# Patient Record
Sex: FEMALE | Race: WHITE | Employment: PART TIME | URBAN - NONMETROPOLITAN AREA
[De-identification: names, ages, dates, MRNs, and addresses within clinical notes are randomized per-mention and may not be internally consistent; named-entity substitution may affect disease eponyms.]

---

## 2019-06-26 ENCOUNTER — APPOINTMENT (OUTPATIENT)
Dept: CT IMAGING | Age: 19
DRG: 885 | End: 2019-06-26
Payer: COMMERCIAL

## 2019-06-26 ENCOUNTER — HOSPITAL ENCOUNTER (OUTPATIENT)
Age: 19
Setting detail: OBSERVATION
Discharge: PSYCHIATRIC OR SUBSTANCE USE FACILITY | DRG: 885 | End: 2019-06-27
Attending: FAMILY MEDICINE | Admitting: PSYCHIATRY & NEUROLOGY
Payer: COMMERCIAL

## 2019-06-26 ENCOUNTER — APPOINTMENT (OUTPATIENT)
Dept: GENERAL RADIOLOGY | Age: 19
DRG: 885 | End: 2019-06-26
Payer: COMMERCIAL

## 2019-06-26 DIAGNOSIS — F23 ACUTE PSYCHOSIS (HCC): Primary | ICD-10-CM

## 2019-06-26 PROBLEM — F43.10 PTSD (POST-TRAUMATIC STRESS DISORDER): Status: ACTIVE | Noted: 2019-06-26

## 2019-06-26 LAB
ACETAMINOPHEN LEVEL: < 5 UG/ML (ref 0–20)
ALBUMIN SERPL-MCNC: 4.1 G/DL (ref 3.5–5.1)
ALP BLD-CCNC: 61 U/L (ref 38–126)
ALT SERPL-CCNC: 9 U/L (ref 11–66)
AMPHETAMINE+METHAMPHETAMINE URINE SCREEN: NEGATIVE
ANION GAP SERPL CALCULATED.3IONS-SCNC: 10 MEQ/L (ref 8–16)
AST SERPL-CCNC: 19 U/L (ref 5–40)
BACTERIA: ABNORMAL /HPF
BARBITURATE QUANTITATIVE URINE: NEGATIVE
BASOPHILS # BLD: 0.5 %
BASOPHILS ABSOLUTE: 0 THOU/MM3 (ref 0–0.1)
BENZODIAZEPINE QUANTITATIVE URINE: NEGATIVE
BILIRUB SERPL-MCNC: 0.7 MG/DL (ref 0.3–1.2)
BILIRUBIN URINE: ABNORMAL
BLOOD, URINE: NEGATIVE
BUN BLDV-MCNC: 13 MG/DL (ref 7–22)
C-REACTIVE PROTEIN: < 0.03 MG/DL (ref 0–1)
CALCIUM SERPL-MCNC: 8.3 MG/DL (ref 8.5–10.5)
CANNABINOID QUANTITATIVE URINE: NEGATIVE
CASTS 2: ABNORMAL /LPF
CASTS UA: ABNORMAL /LPF
CHARACTER, URINE: ABNORMAL
CHLORIDE BLD-SCNC: 103 MEQ/L (ref 98–111)
CO2: 21 MEQ/L (ref 23–33)
COCAINE METABOLITE QUANTITATIVE URINE: NEGATIVE
COLOR: YELLOW
CREAT SERPL-MCNC: 0.7 MG/DL (ref 0.4–1.2)
CRYSTALS, UA: ABNORMAL
EOSINOPHIL # BLD: 0.6 %
EOSINOPHILS ABSOLUTE: 0 THOU/MM3 (ref 0–0.4)
EPITHELIAL CELLS, UA: ABNORMAL /HPF
ERYTHROCYTE [DISTWIDTH] IN BLOOD BY AUTOMATED COUNT: 12.5 % (ref 11.5–14.5)
ERYTHROCYTE [DISTWIDTH] IN BLOOD BY AUTOMATED COUNT: 41.3 FL (ref 35–45)
ETHYL ALCOHOL, SERUM: < 0.01 %
GFR SERPL CREATININE-BSD FRML MDRD: > 90 ML/MIN/1.73M2
GLUCOSE BLD-MCNC: 148 MG/DL (ref 70–108)
GLUCOSE BLD-MCNC: 70 MG/DL (ref 70–108)
GLUCOSE URINE: NEGATIVE MG/DL
HCT VFR BLD CALC: 37.7 % (ref 37–47)
HEMOGLOBIN: 12.8 GM/DL (ref 12–16)
ICTOTEST: NEGATIVE
IMMATURE GRANS (ABS): 0.02 THOU/MM3 (ref 0–0.07)
IMMATURE GRANULOCYTES: 0.3 %
KETONES, URINE: >= 160
LEUKOCYTE ESTERASE, URINE: ABNORMAL
LIPASE: 20.3 U/L (ref 5.6–51.3)
LYMPHOCYTES # BLD: 27.4 %
LYMPHOCYTES ABSOLUTE: 2.1 THOU/MM3 (ref 1–4.8)
MAGNESIUM: 1.8 MG/DL (ref 1.6–2.4)
MCH RBC QN AUTO: 30.7 PG (ref 26–33)
MCHC RBC AUTO-ENTMCNC: 34 GM/DL (ref 32.2–35.5)
MCV RBC AUTO: 90.4 FL (ref 81–99)
MISCELLANEOUS 2: ABNORMAL
MONOCYTES # BLD: 7.9 %
MONOCYTES ABSOLUTE: 0.6 THOU/MM3 (ref 0.4–1.3)
MUCUS: ABNORMAL
NITRITE, URINE: NEGATIVE
NUCLEATED RED BLOOD CELLS: 0 /100 WBC
OPIATES, URINE: NEGATIVE
OSMOLALITY CALCULATION: 271.1 MOSMOL/KG (ref 275–300)
OXYCODONE: NEGATIVE
PH UA: 5.5 (ref 5–9)
PHENCYCLIDINE QUANTITATIVE URINE: NEGATIVE
PLATELET # BLD: 144 THOU/MM3 (ref 130–400)
PMV BLD AUTO: 9.3 FL (ref 9.4–12.4)
POTASSIUM SERPL-SCNC: 3.4 MEQ/L (ref 3.5–5.2)
PREGNANCY, SERUM: NEGATIVE
PRO-BNP: 19.3 PG/ML (ref 0–450)
PROCALCITONIN: 0.02 NG/ML (ref 0.01–0.09)
PROTEIN UA: ABNORMAL
RBC # BLD: 4.17 MILL/MM3 (ref 4.2–5.4)
RBC URINE: ABNORMAL /HPF
RENAL EPITHELIAL, UA: ABNORMAL
SALICYLATE, SERUM: < 0.3 MG/DL (ref 2–10)
SEDIMENTATION RATE, ERYTHROCYTE: 4 MM/HR (ref 0–20)
SEG NEUTROPHILS: 63.3 %
SEGMENTED NEUTROPHILS ABSOLUTE COUNT: 4.9 THOU/MM3 (ref 1.8–7.7)
SODIUM BLD-SCNC: 134 MEQ/L (ref 135–145)
SPECIFIC GRAVITY, URINE: > 1.03 (ref 1–1.03)
TOTAL PROTEIN: 6.5 G/DL (ref 6.1–8)
TROPONIN T: < 0.01 NG/ML
TSH SERPL DL<=0.05 MIU/L-ACNC: 1.5 UIU/ML (ref 0.4–4.2)
UROBILINOGEN, URINE: 0.2 EU/DL (ref 0–1)
WBC # BLD: 7.8 THOU/MM3 (ref 4.8–10.8)
WBC UA: ABNORMAL /HPF
YEAST: ABNORMAL

## 2019-06-26 PROCEDURE — 2709999900 HC NON-CHARGEABLE SUPPLY

## 2019-06-26 PROCEDURE — 83880 ASSAY OF NATRIURETIC PEPTIDE: CPT

## 2019-06-26 PROCEDURE — G0480 DRUG TEST DEF 1-7 CLASSES: HCPCS

## 2019-06-26 PROCEDURE — 80053 COMPREHEN METABOLIC PANEL: CPT

## 2019-06-26 PROCEDURE — 86140 C-REACTIVE PROTEIN: CPT

## 2019-06-26 PROCEDURE — 84703 CHORIONIC GONADOTROPIN ASSAY: CPT

## 2019-06-26 PROCEDURE — 82948 REAGENT STRIP/BLOOD GLUCOSE: CPT

## 2019-06-26 PROCEDURE — 85651 RBC SED RATE NONAUTOMATED: CPT

## 2019-06-26 PROCEDURE — 82550 ASSAY OF CK (CPK): CPT

## 2019-06-26 PROCEDURE — 84484 ASSAY OF TROPONIN QUANT: CPT

## 2019-06-26 PROCEDURE — 84443 ASSAY THYROID STIM HORMONE: CPT

## 2019-06-26 PROCEDURE — 80307 DRUG TEST PRSMV CHEM ANLYZR: CPT

## 2019-06-26 PROCEDURE — 36415 COLL VENOUS BLD VENIPUNCTURE: CPT

## 2019-06-26 PROCEDURE — 83735 ASSAY OF MAGNESIUM: CPT

## 2019-06-26 PROCEDURE — 70450 CT HEAD/BRAIN W/O DYE: CPT

## 2019-06-26 PROCEDURE — 81001 URINALYSIS AUTO W/SCOPE: CPT

## 2019-06-26 PROCEDURE — 99285 EMERGENCY DEPT VISIT HI MDM: CPT

## 2019-06-26 PROCEDURE — G0378 HOSPITAL OBSERVATION PER HR: HCPCS

## 2019-06-26 PROCEDURE — 84145 PROCALCITONIN (PCT): CPT

## 2019-06-26 PROCEDURE — 87086 URINE CULTURE/COLONY COUNT: CPT

## 2019-06-26 PROCEDURE — 83690 ASSAY OF LIPASE: CPT

## 2019-06-26 PROCEDURE — 85025 COMPLETE CBC W/AUTO DIFF WBC: CPT

## 2019-06-26 RX ORDER — 0.9 % SODIUM CHLORIDE 0.9 %
1000 INTRAVENOUS SOLUTION INTRAVENOUS ONCE
Status: DISCONTINUED | OUTPATIENT
Start: 2019-06-26 | End: 2019-06-27 | Stop reason: HOSPADM

## 2019-06-26 ASSESSMENT — PAIN DESCRIPTION - ORIENTATION
ORIENTATION: LOWER
ORIENTATION: LOWER

## 2019-06-26 ASSESSMENT — PAIN DESCRIPTION - FREQUENCY
FREQUENCY: CONTINUOUS
FREQUENCY: CONTINUOUS

## 2019-06-26 ASSESSMENT — PAIN SCALES - GENERAL
PAINLEVEL_OUTOF10: 4
PAINLEVEL_OUTOF10: 4
PAINLEVEL_OUTOF10: 0

## 2019-06-26 ASSESSMENT — ENCOUNTER SYMPTOMS: ABDOMINAL PAIN: 1

## 2019-06-26 ASSESSMENT — PAIN DESCRIPTION - PAIN TYPE
TYPE: ACUTE PAIN
TYPE: ACUTE PAIN

## 2019-06-26 ASSESSMENT — PAIN DESCRIPTION - LOCATION
LOCATION: ABDOMEN
LOCATION: ABDOMEN

## 2019-06-26 NOTE — ED PROVIDER NOTES
Wet Read Report Reviewed   [] Discussed with Radiologist.    Jayne Martinez:   Results for orders placed or performed during the hospital encounter of 06/26/19   CBC Auto Differential   Result Value Ref Range    WBC 7.8 4.8 - 10.8 thou/mm3    RBC 4.17 (L) 4.20 - 5.40 mill/mm3    Hemoglobin 12.8 12.0 - 16.0 gm/dl    Hematocrit 37.7 37.0 - 47.0 %    MCV 90.4 81.0 - 99.0 fL    MCH 30.7 26.0 - 33.0 pg    MCHC 34.0 32.2 - 35.5 gm/dl    RDW-CV 12.5 11.5 - 14.5 %    RDW-SD 41.3 35.0 - 45.0 fL    Platelets 475 509 - 040 thou/mm3    MPV 9.3 (L) 9.4 - 12.4 fL    Seg Neutrophils 63.3 %    Lymphocytes 27.4 %    Monocytes 7.9 %    Eosinophils 0.6 %    Basophils 0.5 %    Immature Granulocytes 0.3 %    Segs Absolute 4.9 1.8 - 7.7 thou/mm3    Lymphocytes # 2.1 1.0 - 4.8 thou/mm3    Monocytes # 0.6 0.4 - 1.3 thou/mm3    Eosinophils # 0.0 0.0 - 0.4 thou/mm3    Basophils # 0.0 0.0 - 0.1 thou/mm3    Immature Grans (Abs) 0.02 0.00 - 0.07 thou/mm3    nRBC 0 /100 wbc   Comprehensive Metabolic Panel   Result Value Ref Range    Glucose 148 (H) 70 - 108 mg/dL    CREATININE 0.7 0.4 - 1.2 mg/dL    BUN 13 7 - 22 mg/dL    Sodium 134 (L) 135 - 145 meq/L    Potassium 3.4 (L) 3.5 - 5.2 meq/L    Chloride 103 98 - 111 meq/L    CO2 21 (L) 23 - 33 meq/L    Calcium 8.3 (L) 8.5 - 10.5 mg/dL    AST 19 5 - 40 U/L    Alkaline Phosphatase 61 38 - 126 U/L    Total Protein 6.5 6.1 - 8.0 g/dL    Alb 4.1 3.5 - 5.1 g/dL    Total Bilirubin 0.7 0.3 - 1.2 mg/dL    ALT 9 (L) 11 - 66 U/L   Lipase   Result Value Ref Range    Lipase 20.3 5.6 - 51.3 U/L   C-reactive protein   Result Value Ref Range    CRP < 0.03 0.00 - 1.00 mg/dl   Sedimentation Rate   Result Value Ref Range    Sed Rate 4 0 - 20 mm/hr   HCG Qualitative, Serum   Result Value Ref Range    Preg, Serum NEGATIVE NEGATIVE   Acetaminophen level   Result Value Ref Range    Acetaminophen Level < 5.0 0.0 - 59.5 ug/mL   Salicylate Level   Result Value Ref Range    Salicylate, Serum < 0.3 (L) 2.0 - 10.0 mg/dL   Urine Drug Screen   Result Value Ref Range    AMPHETAMINE+METHAMPHETAMINE URINE SCREEN Negative NEGATIVE    Barbiturate Quant, Ur Negative NEGATIVE    Benzodiazepine Quant, Ur Negative NEGATIVE    Cannabinoid Quant, Ur Negative NEGATIVE    Cocaine Metab Quant, Ur Negative NEGATIVE    Opiates, Urine Negative NEGATIVE    Oxycodone Negative NEGATIVE    PCP Quant, Ur Negative NEGATIVE   CK   Result Value Ref Range    Total  (H) 30 - 135 U/L   Troponin   Result Value Ref Range    Troponin T < 0.010 ng/ml   Brain Natriuretic Peptide   Result Value Ref Range    Pro-BNP 19.3 0.0 - 450.0 pg/mL   TSH with Reflex   Result Value Ref Range    TSH 1.500 0.400 - 4.20 uIU/mL   Magnesium   Result Value Ref Range    Magnesium 1.8 1.6 - 2.4 mg/dL   Ethanol   Result Value Ref Range    ETHYL ALCOHOL, SERUM < 0.01 0.00 %   Urine with Reflexed Micro   Result Value Ref Range    Glucose, Ur NEGATIVE NEGATIVE mg/dl    Bilirubin Urine SMALL (A) NEGATIVE    Ketones, Urine >= 160 (A) NEGATIVE    Specific Gravity, Urine > 1.030 (A) 1.002 - 1.03    Blood, Urine NEGATIVE NEGATIVE    pH, UA 5.5 5.0 - 9.0    Protein, UA TRACE (A) NEGATIVE    Urobilinogen, Urine 0.2 0.0 - 1.0 eu/dl    Nitrite, Urine NEGATIVE NEGATIVE    Leukocyte Esterase, Urine SMALL (A) NEGATIVE    Color, UA YELLOW STRAW-YELL    Character, Urine CLOUDY (A) CLEAR-SL C    RBC, UA 0-2 0-2/hpf /hpf    WBC, UA 10-15 0-4/hpf /hpf    Epi Cells 5-10 3-5/hpf /hpf    Mucus, UA THREADS NONE SEEN/    Bacteria, UA NONE FEW/NONE S /hpf    Casts UA NONE SEEN NONE SEEN /lpf    Crystals NONE SEEN NONE SEEN    Renal Epithelial, Urine NONE SEEN NONE SEEN    Yeast, UA NONE SEEN NONE SEEN    CASTS 2 NONE SEEN NONE SEEN /lpf    MISCELLANEOUS 2 NONE SEEN    Bile Acids, Total   Result Value Ref Range    Ictotest NEGATIVE NEGATIVE   Procalcitonin   Result Value Ref Range    Procalcitonin 0.02 0.01 - 0.09 ng/mL   Anion Gap   Result Value Ref Range    Anion Gap 10.0 8.0 - 16.0 meq/L   Glomerular Filtration Rate,

## 2019-06-26 NOTE — ED TRIAGE NOTES
Patient presents to the ED via [facility] with concerns of mental health check. When medics arrive, they states [address] were on seen as patient was wondering around town asking for her friend, whom she only knew her name; friend is at bedside and states they lost touch with each other 1 year ago, and last she had heard of from patient was that she ran away from her parents at home and now lives in [facility]. Friend states she has a history of mental health and was admitted to a psych unit. Patient appears to be very fragile and fatigued. Her clothes are clean and neat and she is wearing sandals, however, she is wearing long sweat pants and a 3/4 sleeve. Patient guarding and not wanting to reveal much information. Patient's eyes are fixed and rolling in the back of her head when trying to shine light.

## 2019-06-27 ENCOUNTER — APPOINTMENT (OUTPATIENT)
Dept: ULTRASOUND IMAGING | Age: 19
DRG: 885 | End: 2019-06-27
Payer: COMMERCIAL

## 2019-06-27 ENCOUNTER — HOSPITAL ENCOUNTER (INPATIENT)
Age: 19
LOS: 11 days | Discharge: HOME | DRG: 885 | End: 2019-07-08
Attending: PSYCHIATRY & NEUROLOGY | Admitting: PSYCHIATRY & NEUROLOGY
Payer: COMMERCIAL

## 2019-06-27 VITALS
BODY MASS INDEX: 20.07 KG/M2 | TEMPERATURE: 98.4 F | DIASTOLIC BLOOD PRESSURE: 61 MMHG | HEIGHT: 66 IN | WEIGHT: 124.9 LBS | OXYGEN SATURATION: 97 % | SYSTOLIC BLOOD PRESSURE: 90 MMHG | RESPIRATION RATE: 17 BRPM | HEART RATE: 74 BPM

## 2019-06-27 LAB — TOTAL CK: 141 U/L (ref 30–135)

## 2019-06-27 PROCEDURE — APPSS60 APP SPLIT SHARED TIME 46-60 MINUTES: Performed by: PHYSICIAN ASSISTANT

## 2019-06-27 PROCEDURE — 6370000000 HC RX 637 (ALT 250 FOR IP): Performed by: PSYCHIATRY & NEUROLOGY

## 2019-06-27 PROCEDURE — G0378 HOSPITAL OBSERVATION PER HR: HCPCS

## 2019-06-27 PROCEDURE — 2709999900 HC NON-CHARGEABLE SUPPLY

## 2019-06-27 PROCEDURE — 1240000000 HC EMOTIONAL WELLNESS R&B

## 2019-06-27 PROCEDURE — 76856 US EXAM PELVIC COMPLETE: CPT

## 2019-06-27 RX ORDER — TRAZODONE HYDROCHLORIDE 50 MG/1
50 TABLET ORAL NIGHTLY PRN
Status: DISCONTINUED | OUTPATIENT
Start: 2019-06-27 | End: 2019-06-27 | Stop reason: HOSPADM

## 2019-06-27 RX ORDER — HYDROXYZINE HYDROCHLORIDE 25 MG/1
25 TABLET, FILM COATED ORAL 3 TIMES DAILY
Status: DISCONTINUED | OUTPATIENT
Start: 2019-06-27 | End: 2019-06-27 | Stop reason: HOSPADM

## 2019-06-27 RX ORDER — ACETAMINOPHEN 325 MG/1
650 TABLET ORAL EVERY 4 HOURS PRN
Status: DISCONTINUED | OUTPATIENT
Start: 2019-06-27 | End: 2019-07-08 | Stop reason: HOSPADM

## 2019-06-27 RX ORDER — ACETAMINOPHEN 325 MG/1
650 TABLET ORAL EVERY 4 HOURS PRN
Status: CANCELLED | OUTPATIENT
Start: 2019-06-27

## 2019-06-27 RX ORDER — NICOTINE 21 MG/24HR
1 PATCH, TRANSDERMAL 24 HOURS TRANSDERMAL DAILY
Status: DISCONTINUED | OUTPATIENT
Start: 2019-06-27 | End: 2019-07-06

## 2019-06-27 RX ORDER — LORAZEPAM 0.5 MG/1
0.5 TABLET ORAL ONCE
Status: COMPLETED | OUTPATIENT
Start: 2019-06-27 | End: 2019-06-27

## 2019-06-27 RX ORDER — NICOTINE 21 MG/24HR
1 PATCH, TRANSDERMAL 24 HOURS TRANSDERMAL DAILY
Status: CANCELLED | OUTPATIENT
Start: 2019-06-27

## 2019-06-27 RX ORDER — HYDROXYZINE HYDROCHLORIDE 25 MG/1
50 TABLET, FILM COATED ORAL 3 TIMES DAILY PRN
Status: CANCELLED | OUTPATIENT
Start: 2019-06-27

## 2019-06-27 RX ORDER — LORAZEPAM 2 MG/ML
1 INJECTION INTRAMUSCULAR EVERY 6 HOURS PRN
Status: DISCONTINUED | OUTPATIENT
Start: 2019-06-27 | End: 2019-07-08 | Stop reason: HOSPADM

## 2019-06-27 RX ORDER — HYDROXYZINE HYDROCHLORIDE 25 MG/1
50 TABLET, FILM COATED ORAL 3 TIMES DAILY PRN
Status: DISCONTINUED | OUTPATIENT
Start: 2019-06-27 | End: 2019-07-08 | Stop reason: HOSPADM

## 2019-06-27 RX ADMIN — LORAZEPAM 0.5 MG: 0.5 TABLET ORAL at 06:04

## 2019-06-27 SDOH — HEALTH STABILITY: MENTAL HEALTH: HOW OFTEN DO YOU HAVE A DRINK CONTAINING ALCOHOL?: NOT ASKED

## 2019-06-27 ASSESSMENT — SLEEP AND FATIGUE QUESTIONNAIRES
DO YOU HAVE DIFFICULTY SLEEPING: NO
DO YOU USE A SLEEP AID: NO

## 2019-06-27 ASSESSMENT — PAIN SCALES - GENERAL: PAINLEVEL_OUTOF10: 0

## 2019-06-27 ASSESSMENT — LIFESTYLE VARIABLES: HISTORY_ALCOHOL_USE: NO

## 2019-06-27 NOTE — ED NOTES
Pt taken to room 39 at this time via ED cart. Cart locked and lowered and pt assisted to stand and ambulated to pelvic bed in room. Pt assisted to stand, pt with an unsteady gait and stiff back. Pt states that her back does hurt. Pt also with some complaints of abdominal pain. Pt did safely get to bed and was covered with warm blankets. Pt with no other needs at this moment.       Rosa Daily RN  06/26/19 [id number]

## 2019-06-27 NOTE — ED NOTES
Patient stated she wanted the IV removed because it was painful. This nurse explained IV may still be needed for admission. Patient stated she no longer wanted it and did not want another IV placed.  This nurse removed IV per pt request.      Sharri Henley RN  06/26/19 7748

## 2019-06-27 NOTE — PROGRESS NOTES
Patient in shower, sitter in shower room with patient, patient started to shake and shiver in shower, when patient returned to room, the shaking worsened, she was moaning loudly, clenching her jaw and eyes rolling in the back of her head, 0508, Kurt Mosher and Arabella Dominguez in room, 0510, rapid response called, 0512, rapid team arrived, Levar Lopez, [address], /92, , 0514, Dr. Rebecca Glass (EKG) and resp arrived, 0515, /72, , 0516, Camron Schmitz, house supervisor arrived, 5683 Dr. Katie Womack stated it was not cardiac related and EKG was not necessary, 0514 Dr. Poppy Pascal called, message left, 0518 HR 85, 0525, BP 99/69 HR 82 O2 96% on RA, 0538, Dr. Poppy Pascal called again, message left, Narciso Hanson, Dr. Poppy Pascal called with new orders, 0604, 0.5mg ativan given, patient resting in bed with sitter at bed side, when asked if she has had episodes like this before, she nodded her head yes
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
## 2019-06-27 NOTE — PROGRESS NOTES
Pt no longer withdrawn, pt up beat, standing in room, singing, eating well now, pacing in room. Pt hit call light and ask staff to erase the L from Labs so it said abs on the board and started laughing. Dr. Crispin Marcum notified, wants to send pt to psych at this time.

## 2019-06-27 NOTE — PROGRESS NOTES
Financial staff came back to get pts SS# since pt had got it; but then states \"the paper isnt real and she knows what is real\". Therefore they will try again later.

## 2019-06-28 PROBLEM — F29 PSYCHOSIS (HCC): Status: ACTIVE | Noted: 2019-06-28

## 2019-06-28 LAB
ORGANISM: ABNORMAL
URINE CULTURE REFLEX: ABNORMAL

## 2019-06-28 PROCEDURE — 1240000000 HC EMOTIONAL WELLNESS R&B

## 2019-06-28 PROCEDURE — 6370000000 HC RX 637 (ALT 250 FOR IP): Performed by: PHYSICIAN ASSISTANT

## 2019-06-28 PROCEDURE — 6360000002 HC RX W HCPCS

## 2019-06-28 PROCEDURE — APPSS45 APP SPLIT SHARED TIME 31-45 MINUTES: Performed by: PHYSICIAN ASSISTANT

## 2019-06-28 PROCEDURE — 99222 1ST HOSP IP/OBS MODERATE 55: CPT | Performed by: PSYCHIATRY & NEUROLOGY

## 2019-06-28 PROCEDURE — 6360000002 HC RX W HCPCS: Performed by: PSYCHIATRY & NEUROLOGY

## 2019-06-28 RX ORDER — LORAZEPAM 2 MG/ML
2 INJECTION INTRAMUSCULAR ONCE
Status: COMPLETED | OUTPATIENT
Start: 2019-06-28 | End: 2019-06-28

## 2019-06-28 RX ORDER — RISPERIDONE 1 MG/1
1 TABLET, FILM COATED ORAL 2 TIMES DAILY
Status: DISCONTINUED | OUTPATIENT
Start: 2019-06-28 | End: 2019-06-30

## 2019-06-28 RX ORDER — HALOPERIDOL 5 MG/ML
5 INJECTION INTRAMUSCULAR ONCE
Status: COMPLETED | OUTPATIENT
Start: 2019-06-28 | End: 2019-06-28

## 2019-06-28 RX ORDER — HALOPERIDOL 5 MG/ML
INJECTION INTRAMUSCULAR
Status: COMPLETED
Start: 2019-06-28 | End: 2019-06-28

## 2019-06-28 RX ORDER — RISPERIDONE 0.25 MG/1
0.5 TABLET, FILM COATED ORAL 2 TIMES DAILY
Status: DISCONTINUED | OUTPATIENT
Start: 2019-06-28 | End: 2019-06-28

## 2019-06-28 RX ADMIN — LORAZEPAM 2 MG: 2 INJECTION INTRAMUSCULAR; INTRAVENOUS at 04:19

## 2019-06-28 RX ADMIN — HALOPERIDOL 5 MG: 5 INJECTION INTRAMUSCULAR at 04:19

## 2019-06-28 RX ADMIN — LORAZEPAM 1 MG: 2 INJECTION INTRAMUSCULAR; INTRAVENOUS at 03:47

## 2019-06-28 RX ADMIN — RISPERIDONE 1 MG: 1 TABLET ORAL at 23:02

## 2019-06-28 RX ADMIN — HALOPERIDOL LACTATE 5 MG: 5 INJECTION INTRAMUSCULAR at 04:19
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
## 2019-06-28 NOTE — PROGRESS NOTES
One-to-one maintained  Mental status assessment Pt remains asleep in unlocked seclusion room  Patient response to interventions pt asleep  Criteria for discontinuation of 1:1 clear thoughts, appropriate behaviors  Rational for continuance of 1:1 precautions Per Doctors orders

## 2019-06-28 NOTE — PROGRESS NOTES
Nutrition Assessment    Type and Reason for Visit: Initial(poor oral intake, unplanned weight loss)    Nutrition Recommendations:   Continue current diet. ONS initiated, Ensure Enlive TID, continue. Consider a multivitamin. Nutrition Assessment:   Pt. nutritionally compromised AEB reports of poor appetite and unplanned weight loss prior to admit. At risk for further nutrition compromise r/t psychological status and need for nutrition support. Will provide Ensure Enlive TID. Will recommend consider a multivitamin. Plan to see/assess pt further when pt appropriate. Malnutrition Assessment:  · Malnutrition Status: Insufficient data    Nutrition Risk Level: High    Nutrient Needs:  · Estimated Daily Total Kcal: 6837-0737 kcals (25-30 kcals/kg/day based on 57 kg)   · Estimated Daily Protein (g): 68 grams (1.2 gram/kg/day based on 57 kg)     Nutrition Diagnosis:   · Problem: Inadequate oral intake  · Etiology: related to Psychological cause/life stress, Insufficient energy/nutrient consumption     Signs and symptoms:  as evidenced by Diet history of poor intake    Objective Information:  · Nutrition-Focused Physical Findings: Pt with psychosis, confused, impulsive, delusional, inappropriate to see per pt's nurse, [facility]. · Wound Type: None  · Current Nutrition Therapies:  · Oral Diet Orders: General   · Oral Diet intake: 1-25%, 51-75%  · Oral Nutrition Supplement (ONS) Orders: Standard High Calorie Oral Supplement(Ensure Enlive TID)  · ONS intake: Unable to assess  · Anthropometric Measures:  · Ht: 5' 6\" (167.6 cm)   · Current Body Wt: 124 lb 8 oz (56.5 kg)(6/27/19 standing scale, no edema)  · Admission Body Wt: 124 lb 8 oz (56.5 kg)(6/27/19 standing scale, no edema)  · Usual Body Wt: (Unable to ask pt.   Only EMR weight: 6/26/19: 124#14.4oz)  · % Weight Change:  ,  Unable to determine at this time  · Ideal Body Wt: 130 lb (59 kg),   · BMI Classification: BMI 18.5 - 24.9 Normal Weight    Nutrition

## 2019-06-28 NOTE — PROGRESS NOTES
One-to-one maintained  Mental status assessment Pt remains asleep in unlocked seclusion room  Patient response to interventions pt remains asleep  Criteria for discontinuation of 1:1 clear thoughts, appropriate behaviors  Rational for continuance of 1:1 precautions Per Doctors orders

## 2019-06-28 NOTE — BH NOTE
Patient declined to attend the goal group and the recreation group due to patient sleeping at time of both groups.
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
## 2019-06-28 NOTE — CONSULTS
On floor for consult. Pt sleeping. Discussed patient with nursing staff. Prefers not to awaken patient. Her pain is controlled. She is not in a good mental position to undergo an exam or consult at this time. Recommend if she continues to have vaginal itching to use diflucan 150 mg PO x1 for possible yeast infection. She can follow up for pelvic pain and vaginal itching as an outpatient unless pain becomes severe or significantly worsens while in house.      Woodrow Gear  2:29 PM  6/28/2019

## 2019-06-28 NOTE — BH NOTE
One-to-one maintained  Mental status assessment Psychosis, confused  Patient response to interventions fair  Criteria for discontinuation of 1:1 clear thoughts, appropriate behaviors  Rational for continuance of 1:1 precautions Remains impulsive and delusional
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
## 2019-06-28 NOTE — PROGRESS NOTES
from Aurelia Myers here to complete assessment- informed pt has been asleep all shift and was not appropriate for assessment to be completed.  She reports plan to talk with staff to attempt in completing assessment Sat 6/29/2019 if unable will plan to complete on Monday 7/1/2019

## 2019-06-29 PROCEDURE — 1240000000 HC EMOTIONAL WELLNESS R&B

## 2019-06-29 PROCEDURE — 6370000000 HC RX 637 (ALT 250 FOR IP): Performed by: PHYSICIAN ASSISTANT

## 2019-06-29 PROCEDURE — 99232 SBSQ HOSP IP/OBS MODERATE 35: CPT | Performed by: PSYCHIATRY & NEUROLOGY

## 2019-06-29 RX ADMIN — RISPERIDONE 1 MG: 1 TABLET ORAL at 20:34

## 2019-06-29 ASSESSMENT — PAIN SCALES - GENERAL
PAINLEVEL_OUTOF10: 0
PAINLEVEL_OUTOF10: 0

## 2019-06-29 NOTE — BH NOTE
One-to-one maintained  Mental status assessment pt sleeping  Patient response to interventions sleeping  Criteria for discontinuation of 1:1 absence of behaviors  Rational for continuance of 1:1 precautions disruptive and confrontation behaviors with peers

## 2019-06-29 NOTE — PROGRESS NOTES
One-to-one maintained  Mental status assessment Psychosis, confused  Patient response to interventions fair  Criteria for discontinuation of 1:1 clear thoughts, appropriate behaviors  Rational for continuance of 1:1 precautions Remains impulsive and delusional     Still refusing to take medication, smiles inappropriately, laughs inappropriately, states she doesn't know why she is here

## 2019-06-29 NOTE — PROGRESS NOTES
One-to-one maintained  Mental status assessment Psychosis, confused  Patient response to interventions fair  Criteria for discontinuation of 1:1 clear thoughts, appropriate behaviors  Rational for continuance of 1:1 precautions Remains impulsive and delusional     Pt paces halls, still refusing to take medication, smiles inappropriately

## 2019-06-29 NOTE — GROUP NOTE
Group Therapy Note    Date: June 29    Group Start Time: 1330  Group End Time: 1435  Group Topic: Psychoeducation    [facility] 4E    Radha Cervantes, JENNIW        Group Therapy Note    Attendees: 6         Notes:  Group members completed a self-care inventory and processed the results, generating ideas to improve in all areas. Patient came to group late and was hesitant to participate. She stayed for a short time with some participation before deciding to leave. Status After Intervention:  Unchanged    Participation Level:  Active Listener and Interactive    Participation Quality: Appropriate, Attentive, Sharing and Supportive      Speech:  normal      Thought Process/Content: Logical      Affective Functioning: Congruent      Mood: anxious      Level of consciousness:  Alert, Oriented x4 and Attentive      Response to Learning: Able to verbalize current knowledge/experience and Able to retain information      Endings: None Reported    Modes of Intervention: Education, Support, Socialization and Exploration      Discipline Responsible: /Counselor      Signature:  Marce Arreola

## 2019-06-29 NOTE — PROGRESS NOTES
Daily Progress Note  Miguel Last MD  6/29/2019  CHIEF COMPLAINT:  psychosis    Reviewed patient's current plan of care and vital signs with nursing staff. Sleep:  8 hours last night  Attending groups: No: isolated to room    SUBJECTIVE:    Patient is seen in her room today. She was with her sitter. Discussed with her about the incident that happened on the night of admission. Patient reported that she was feeling stressed out and mentions that she could not recollect the incident at all. Continues to exhibit odd and bizarre behaviors here on the unit at times. She is paranoid that staff is trying to hurt her. She reports good sleep and appetite. Staff reported that she was refusing to take her medications. Discussed with her at length about the brief psychotic episode that she had in the past and why she was put on antipsychotic medications. Patient could not really explain her situation and why she was taking medications. She agreed to stay and get help and also take medications today. Well continue to observe her behaviors and encouragement compliance. Mental Status Exam  Level of consciousness:  Within normal limits  Appearance: Hospital attire, seated in chair, with good grooming and hygiene   Behavior/Motor: No abnormalities noted  Attitude toward examiner:  Cooperative, attentive, good eye contact  Speech:  spontaneous, normal rate, normal volume and well articulated  Mood:  anxious  Affect: labile  Thought processes:  Some paranoia  Thought content:  denies homicidal ideation  Suicidal Ideation: denies suicidal ideation  Delusions:  no evidence of delusions  Perceptual Disturbance: Paranoid, inappropriate smiling  Cognition:  Oriented to self, location, time, and situation  Memory: age appropriate  Insight & Judgement: improving  Medication side effects:  denies       Data   height is 5' 6\" (1.676 m) and weight is 124 lb 8 oz (56.5 kg). Her oral temperature is 98 °F (36.7 °C).  Her blood pressure is 121/89 and her pulse is 101. Her respiration is 16 and oxygen saturation is 98%. Labs:   No visits with results within 2 Day(s) from this visit.    Latest known visit with results is:   Admission on 06/26/2019, Discharged on 06/27/2019   Component Date Value Ref Range Status    WBC 06/26/2019 7.8  4.8 - 10.8 thou/mm3 Final    RBC 06/26/2019 4.17* 4.20 - 5.40 mill/mm3 Final    Hemoglobin 06/26/2019 12.8  12.0 - 16.0 gm/dl Final    Hematocrit 06/26/2019 37.7  37.0 - 47.0 % Final    MCV 06/26/2019 90.4  81.0 - 99.0 fL Final    MCH 06/26/2019 30.7  26.0 - 33.0 pg Final    MCHC 06/26/2019 34.0  32.2 - 35.5 gm/dl Final    RDW-CV 06/26/2019 12.5  11.5 - 14.5 % Final    RDW-SD 06/26/2019 41.3  35.0 - 45.0 fL Final    Platelets 27/59/1335 144  130 - 400 thou/mm3 Final    MPV 06/26/2019 9.3* 9.4 - 12.4 fL Final    Seg Neutrophils 06/26/2019 63.3  % Final    Lymphocytes 06/26/2019 27.4  % Final    Monocytes 06/26/2019 7.9  % Final    Eosinophils 06/26/2019 0.6  % Final    Basophils 06/26/2019 0.5  % Final    Immature Granulocytes 06/26/2019 0.3  % Final    Segs Absolute 06/26/2019 4.9  1.8 - 7.7 thou/mm3 Final    Lymphocytes # 06/26/2019 2.1  1.0 - 4.8 thou/mm3 Final    Monocytes # 06/26/2019 0.6  0.4 - 1.3 thou/mm3 Final    Eosinophils # 06/26/2019 0.0  0.0 - 0.4 thou/mm3 Final    Basophils # 06/26/2019 0.0  0.0 - 0.1 thou/mm3 Final    Immature Grans (Abs) 06/26/2019 0.02  0.00 - 0.07 thou/mm3 Final    nRBC 06/26/2019 0  /100 wbc Final    Performed at [address] [place] [address], [facility]    Glucose 06/26/2019 148* 70 - 108 mg/dL Final    CREATININE 06/26/2019 0.7  0.4 - 1.2 mg/dL Final    BUN 06/26/2019 13  7 - 22 mg/dL Final    Sodium 06/26/2019 134* 135 - 145 meq/L Final    Potassium 06/26/2019 3.4* 3.5 - 5.2 meq/L Final    Chloride 06/26/2019 103  98 - 111 meq/L Final    CO2 06/26/2019 21* 23 - 33 meq/L Final    Calcium 06/26/2019 8.3* 8.5 - 10.5 mg/dL Final    AST 06/26/2019 19  5 - 40 U/L Final    Alkaline Phosphatase 06/26/2019 61  38 - 126 U/L Final    Total Protein 06/26/2019 6.5  6.1 - 8.0 g/dL Final    Alb 06/26/2019 4.1  3.5 - 5.1 g/dL Final    Total Bilirubin 06/26/2019 0.7  0.3 - 1.2 mg/dL Final    ALT 06/26/2019 9* 11 - 66 U/L Final    Performed at [address] [facility], [address]    Lipase 06/26/2019 20.3  5.6 - 51.3 U/L Final    Performed at [address] [place] [address], [facility]    CRP 06/26/2019 < 0.03  0.00 - 1.00 mg/dl Final    Performed at [address] [place] [address], [address]    Sed Rate 06/26/2019 4  0 - 20 mm/hr Final    Performed at [address] [place] [address], [address], Serum 06/26/2019 NEGATIVE  NEGATIVE Final    Performed at [address] [place] [address], [address]    Acetaminophen Level 06/26/2019 < 5.0  0.0 - 20.0 ug/mL Final    Performed at [address] [place] [address], [address]    Salicylate, Serum 89/45/0542 < 0.3* 2.0 - 10.0 mg/dL Final    Performed at [address] [place] [address], [address]    AMPHETAMINE+METHAMPHETAMINE URINE * 06/26/2019 Negative  NEGATIVE Final    Barbiturate Quant, Ur 06/26/2019 Negative  NEGATIVE Final    Benzodiazepine Quant, Ur 06/26/2019 Negative  NEGATIVE Final    Cannabinoid Quant, Ur 06/26/2019 Negative  NEGATIVE Final    Cocaine Metab Quant, Ur 06/26/2019 Negative  NEGATIVE Final    Opiates, Urine 06/26/2019 Negative  NEGATIVE Final    Oxycodone 06/26/2019 Negative  NEGATIVE Final    PCP Quant, Ur 06/26/2019 Negative  NEGATIVE Final    Comment: A \"Negative\" result for a drug abuse screen test indicates     that the drug concentration is below the following cutoffs:            Amphetamine/Methamphetamine     1000 ng/ml            Barbiturate                      200 ng/ml            Benzodiazapine                   200 ng/ml            Cannabinoids 50 ng/ml            Cocaine Metabolite               300 ng/ml            Opiates                          300 ng/ml            Oxycodone                        100 ng/ml            Phencyclidine                     25 ng/ml  A \"Positive\" result for a drug abuse screen test should be     considered presumptive positive until/unless confirmed     by another method. (Additional request)  Quantitative values from a reference laboratory are     available upon additional request.  These results are for medical use only. Performed at [address] [facility], [address]      Total CK 06/26/2019 141* 30 - 135 U/L Final    Performed at [address] [facility], [address]    Troponin T 06/26/2019 < 0.010  ng/ml Final    Comment: <0.010 ng/ml        Normal  > or = 0.010 ng/ml  Elevated   (99%)                      Consistent with myocardial damage    Cardiac troponin values can be elevated by many disease states in addition  to acute ischemia. These include, but are not limited to: chronic renal  failure, CHF, CVA, pulmonary embolus, COPD, myocardial trauma/surgery,  myocarditis, pericarditis, tachycardia, aortic dissection, amyloidosis,  sepsis and strenuous exercise. Serial measurement of troponin is strongly  recommended as a first step in determining whether a low level elevation  represents an acute or chronic condition. Performed at [address] [facility], [address]      Pro-BNP 06/26/2019 19.3  0.0 - 450.0 pg/mL Final    Comment:   Values < 300 pg/ml \"rule out\", or make the probability of heart failure   highly unlikely. Values which \"rule in\" heart failue are as follows:          AGE                  RANGE         <50 years            >450 pg/ml         50-75 years          >900 pg/ml         >75 years            >1800 pg/ml  Performed at [address] [facility], [address]      POC Glucose 06/26/2019 70  70 - 108 mg/dl Final Performed at [facility], [address] [facility] TSH 06/26/2019 1.500  0.400 - 4.20 uIU/mL Final    Performed at [address] [facility], [address]    Magnesium 06/26/2019 1.8  1.6 - 2.4 mg/dL Final    Performed at [address] [facility], [address], SERUM 06/26/2019 < 0.01  0.00 % Final    Performed at [address] [place] [address], [address]    Glucose, Ur 06/26/2019 NEGATIVE  NEGATIVE mg/dl Final    Bilirubin Urine 06/26/2019 SMALL* NEGATIVE Final    Ketones, Urine 06/26/2019 >= 160* NEGATIVE Final    Specific Gravity, Urine 06/26/2019 > 1.030* 1.002 - 1.03 Final    Blood, Urine 06/26/2019 NEGATIVE  NEGATIVE Final    pH, UA 06/26/2019 5.5  5.0 - 9.0 Final    Protein, UA 06/26/2019 TRACE* NEGATIVE Final    Urobilinogen, Urine 06/26/2019 0.2  0.0 - 1.0 eu/dl Final    Nitrite, Urine 06/26/2019 NEGATIVE  NEGATIVE Final    Leukocyte Esterase, Urine 06/26/2019 SMALL* NEGATIVE Final    Color, UA 06/26/2019 YELLOW  STRAW-YELL Final    Character, Urine 06/26/2019 CLOUDY* CLEAR-SL C Final    RBC, UA 06/26/2019 0-2  0-2/hpf /hpf Final    WBC, UA 06/26/2019 10-15  0-4/hpf /hpf Final    Epi Cells 06/26/2019 5-10  3-5/hpf /hpf Final    Mucus, UA 06/26/2019 THREADS  NONE SEEN/ Final    Bacteria, UA 06/26/2019 NONE  FEW/NONE S /hpf Final    Casts UA 06/26/2019 NONE SEEN  NONE SEEN /lpf Final    Crystals 06/26/2019 NONE SEEN  NONE SEEN Final    Renal Epithelial, Urine 06/26/2019 NONE SEEN  NONE SEEN Final    Yeast, UA 06/26/2019 NONE SEEN  NONE SEEN Final    CASTS 2 06/26/2019 NONE SEEN  NONE SEEN /lpf Final    MISCELLANEOUS 2 06/26/2019 NONE SEEN   Final    Performed at [facility], [address]    [address] 06/26/2019 NEGATIVE  NEGATIVE Final    Performed at [address] [facility], [address]    Urine Culture Reflex 06/26/2019 *  Final [address] [place], [place] [address]      Osmolality Calc 06/26/2019 271.1* 275.0 - 300 mOsmol/kg Final    Performed at [address] [facility], [address]            Medications  Current Facility-Administered Medications: risperiDONE (RISPERDAL) tablet 1 mg, 1 mg, Oral, BID  acetaminophen (TYLENOL) tablet 650 mg, 650 mg, Oral, Q4H PRN  hydrOXYzine (ATARAX) tablet 50 mg, 50 mg, Oral, TID PRN  magnesium hydroxide (MILK OF MAGNESIA) 400 MG/5ML suspension 30 mL, 30 mL, Oral, Daily PRN  nicotine (NICODERM CQ) 14 MG/24HR 1 patch, 1 patch, Transdermal, Daily  LORazepam (ATIVAN) injection 1 mg, 1 mg, Intramuscular, Q6H PRN    ASSESSMENT  Psychosis ([address]     PLAN  Patient s symptoms   are improving  Continue with current medications. Attempt to develop insight  Psycho-education conducted. Supportive Therapy conducted. Probable discharge is TBD  Follow-up TBD    Electronically signed by Bebo Ashby MD on 6/29/19 at 7:59 PM    **This report has been created using voice recognition software. It may contain minor errors which are inherent in voice recognition technology. **

## 2019-06-29 NOTE — GROUP NOTE
Group Therapy Note    Group Start Time: 1630  Group End Time: 1700  Group Topic: Healthy Living/Wellness    Attendees: 11    Notes:  Patient attended group, did not participate in activity but did participate in group discussion. Status After Intervention:  Improved    Participation Level:  Active Listener and Interactive    Participation Quality: Attentive, Sharing and Resistant    Speech:  hesitant    Thought Process/Content: Logical  Linear    Affective Functioning: Incongruent, Blunted and Flat    Mood: anxious and depressed    Level of consciousness:  Alert, Oriented x4 and Attentive    Response to Learning: Able to retain information, Able to change behavior, Progressing to goal and Resistant    Endings: None Reported    Modes of Intervention: Education, Support, Socialization and Activity    Discipline Responsible: Licensed Practical Nurse    Signature:  Lisa Luna LPN

## 2019-06-29 NOTE — BH NOTE
This RN has reviewed and agrees with Mel Howard LPN's data collection and has collaborated with this LPN regarding the patient's care plan.

## 2019-06-29 NOTE — PLAN OF CARE
Met This Shift  Note:   Maintained in safe and secure environment. Patient did not fill out safety plan this shift.   6/29/2019 0422 by Joana Hermosillo LPN  Outcome: Ongoing  Note:   No safety plan completed at this time. Problem: Coping:  Goal: Ability to cope will improve  Description  Ability to cope will improve  6/29/2019 1525 by Shelley Regalado RN  Outcome: Not Met This Shift  Note:   Pt has poor coping skills, smiles inappropriately  6/29/2019 0422 by Joana Hermosillo LPN  Outcome: Ongoing  Note:   Unable to assess. Patient asleep most of evening. Problem: Anxiety:  Goal: Level of anxiety will decrease  Description  Level of anxiety will decrease  6/29/2019 1525 by Shelley Regalado RN  Outcome: Not Met This Shift  Note:   Denies anxiety, may be incongruent, will continue to monitor  6/29/2019 0422 by Joana Hermosillo LPN  Outcome: Ongoing  Note:   Patient denies having any anxiety at this time. Problem: Nutrition  Goal: Optimal nutrition therapy  6/29/2019 1525 by Shelley Regalado RN  Outcome: Not Met This Shift  Note:   Pt wanders halls, eating 25% at meals, refused medication, smiles inappropriately, follows staff directions poorly  6/29/2019 0422 by Joana Hermosillo LPN  Outcome: Ongoing  Note:   Patient did not get an evenig snack. Care plan reviewed with patient.   Patient does not verbalize understanding of the plan of care and does contribute to goal setting

## 2019-06-30 PROCEDURE — 6370000000 HC RX 637 (ALT 250 FOR IP): Performed by: PHYSICIAN ASSISTANT

## 2019-06-30 PROCEDURE — 1240000000 HC EMOTIONAL WELLNESS R&B

## 2019-06-30 PROCEDURE — 6370000000 HC RX 637 (ALT 250 FOR IP): Performed by: PSYCHIATRY & NEUROLOGY

## 2019-06-30 PROCEDURE — 99232 SBSQ HOSP IP/OBS MODERATE 35: CPT | Performed by: PSYCHIATRY & NEUROLOGY

## 2019-06-30 RX ORDER — DIAPER,BRIEF,INFANT-TODD,DISP
EACH MISCELLANEOUS 2 TIMES DAILY PRN
Status: DISCONTINUED | OUTPATIENT
Start: 2019-06-30 | End: 2019-07-08 | Stop reason: HOSPADM

## 2019-06-30 RX ORDER — RISPERIDONE 0.5 MG/1
0.5 TABLET, ORALLY DISINTEGRATING ORAL NIGHTLY
Status: DISCONTINUED | OUTPATIENT
Start: 2019-07-01 | End: 2019-07-03

## 2019-06-30 RX ORDER — RISPERIDONE 1 MG/1
1 TABLET, ORALLY DISINTEGRATING ORAL 2 TIMES DAILY
Status: DISCONTINUED | OUTPATIENT
Start: 2019-06-30 | End: 2019-06-30

## 2019-06-30 RX ADMIN — HYDROXYZINE HYDROCHLORIDE 50 MG: 25 TABLET, FILM COATED ORAL at 20:55

## 2019-06-30 RX ADMIN — HYDROCORTISONE: 1 CREAM TOPICAL at 16:43

## 2019-06-30 ASSESSMENT — PAIN SCALES - GENERAL
PAINLEVEL_OUTOF10: 0
PAINLEVEL_OUTOF10: 0

## 2019-06-30 NOTE — PROGRESS NOTES
One-to-one maintained  Mental status assessment Psychosis, confused  Patient response to interventions fair  Criteria for discontinuation of 1:1 Appropriate behavior, clear thoughts  Rational for continuance of 1:1 precautions Remains impulsive and delusional     Pt refusing medication, states  told her yesterday that she only had to take medication 1x/day

## 2019-06-30 NOTE — BH NOTE
One-to-one maintained  Mental status assessment Psychosis, confused  Patient response to interventions fair  Criteria for discontinuation of 1:1 Appropriate behavior, clear thoughts  Rational for continuance of 1:1 precautions Remains impulsive and delusional

## 2019-06-30 NOTE — PLAN OF CARE
has poor coping skills, laughs when this nurse discuses them     Problem: Anxiety:  Goal: Level of anxiety will decrease  Description  Level of anxiety will decrease  Outcome: Ongoing  Note:   Pt denies anxiety     Problem: Agitation  Goal: Decrease in feelings of agitation  Outcome: Ongoing  Note:   Pt not agitated at this time, will continue to monitor     Problem: Nutrition  Goal: Optimal nutrition therapy  Outcome: Ongoing  Note:   Patient eating 25-50% of meals today. Encouraged patient to drink supplements for increase calorie intake. Problem: Discharge Planning:  Goal: Discharged to appropriate level of care  Description  Discharged to appropriate level of care  Outcome: Not Met This Shift  Note:   Discharge planners working with patient to achieve optimal discharge plan, specific to the needs of this patient. Care plan reviewed with patient.   Patient does not verbalize understanding of the plan of care and does not contribute to goal setting

## 2019-06-30 NOTE — GROUP NOTE
Group Therapy Note    Date: June 30    Group Start Time: 1630  Group End Time: 1700  Group Topic: Healthy Living/Wellness  Attendees: 8    Notes:  Patient attended group but did not work on worksheets or participate in group discussion.     Status After Intervention:  Unchanged    Participation Level: Minimal    Participation Quality: Appropriate and Resistant    Speech:  normal    Thought Process/Content: Logical  Linear    Affective Functioning: Incongruent, Blunted and Flat    Mood: anxious and depressed    Level of consciousness:  Alert and Preoccupied    Response to Learning: Able to change behavior, Progressing to goal and Resistant    Endings: None Reported    Modes of Intervention: Education, Support, Socialization and Activity    Discipline Responsible: Licensed Practical Nurse    Signature:  Nika Jhaveri LPN

## 2019-06-30 NOTE — BH NOTE
Patient stated that she would take her bedtime risperdal.  Patient did put pill in her mouth, but then attempted to cheek pill. Nurse did mouth check and encouraged patient to swallow the pill. Patient then took and drink of water with the pill in her mouth. After drinking the water she made a cough noise with hand to mouth and then put her hand behind her back when she was done. Nurse asked patient to open the hand, which she did and pill was noted to be in her hand. Patient then put the pill back into her mouth and attempted to cheek the pill instead of swallow it 2 more times. Each time nurse did a mouth check and encouraged her to swallow the pill. Patient then took a third drink of water and did swallow the pill at this time.

## 2019-07-01 PROCEDURE — 6370000000 HC RX 637 (ALT 250 FOR IP): Performed by: PSYCHIATRY & NEUROLOGY

## 2019-07-01 PROCEDURE — 1240000000 HC EMOTIONAL WELLNESS R&B

## 2019-07-01 PROCEDURE — 6370000000 HC RX 637 (ALT 250 FOR IP): Performed by: PHYSICIAN ASSISTANT

## 2019-07-01 PROCEDURE — 90833 PSYTX W PT W E/M 30 MIN: CPT | Performed by: PSYCHIATRY & NEUROLOGY

## 2019-07-01 PROCEDURE — 99232 SBSQ HOSP IP/OBS MODERATE 35: CPT | Performed by: PSYCHIATRY & NEUROLOGY

## 2019-07-01 PROCEDURE — APPSS30 APP SPLIT SHARED TIME 16-30 MINUTES: Performed by: PHYSICIAN ASSISTANT

## 2019-07-01 RX ADMIN — HYDROXYZINE HYDROCHLORIDE 50 MG: 25 TABLET, FILM COATED ORAL at 21:16

## 2019-07-01 RX ADMIN — RISPERIDONE 0.5 MG: 0.5 TABLET, ORALLY DISINTEGRATING ORAL at 21:16

## 2019-07-01 ASSESSMENT — PAIN SCALES - GENERAL: PAINLEVEL_OUTOF10: 0

## 2019-07-01 NOTE — PROGRESS NOTES
result for a drug abuse screen test should be     considered presumptive positive until/unless confirmed     by another method. (Additional request)  Quantitative values from a reference laboratory are     available upon additional request.  These results are for medical use only. Performed at [address] [place] [address], [facility]      Total CK 06/26/2019 141* 30 - 135 U/L Final    Performed at [address] [place] [address], [facility]    Troponin T 06/26/2019 < 0.010  ng/ml Final    Comment: <0.010 ng/ml        Normal  > or = 0.010 ng/ml  Elevated   (99%)                      Consistent with myocardial damage    Cardiac troponin values can be elevated by many disease states in addition  to acute ischemia. These include, but are not limited to: chronic renal  failure, CHF, CVA, pulmonary embolus, COPD, myocardial trauma/surgery,  myocarditis, pericarditis, tachycardia, aortic dissection, amyloidosis,  sepsis and strenuous exercise. Serial measurement of troponin is strongly  recommended as a first step in determining whether a low level elevation  represents an acute or chronic condition. Performed at [address] [place] [address], [facility]      Pro-BNP 06/26/2019 19.3  0.0 - 450.0 pg/mL Final    Comment:   Values < 300 pg/ml \"rule out\", or make the probability of heart failure   highly unlikely. Values which \"rule in\" heart failue are as follows:          AGE                  RANGE         <50 years            >450 pg/ml         50-75 years          >900 pg/ml         >75 years            >1800 pg/ml  Performed at [address] [place] [address], [address] POC Glucose 06/26/2019 70  70 - 108 mg/dl Final    Performed at [address] [place] [address], [address]    TSH 06/26/2019 1.500  0.400 - 4.20 uIU/mL Final    Performed at [address] [place] [address], [address]    Magnesium 06/26/2019 1.8  1.6 - 2.4 mg/dL Final Performed at [address] [facility], [address], SERUM 06/26/2019 < 0.01  0.00 % Final    Performed at [facility], [address]    Glucose, Ur 06/26/2019 NEGATIVE  NEGATIVE mg/dl Final    Bilirubin Urine 06/26/2019 SMALL* NEGATIVE Final    Ketones, Urine 06/26/2019 >= 160* NEGATIVE Final    Specific Gravity, Urine 06/26/2019 > 1.030* 1.002 - 1.03 Final    Blood, Urine 06/26/2019 NEGATIVE  NEGATIVE Final    pH, UA 06/26/2019 5.5  5.0 - 9.0 Final    Protein, UA 06/26/2019 TRACE* NEGATIVE Final    Urobilinogen, Urine 06/26/2019 0.2  0.0 - 1.0 eu/dl Final    Nitrite, Urine 06/26/2019 NEGATIVE  NEGATIVE Final    Leukocyte Esterase, Urine 06/26/2019 SMALL* NEGATIVE Final    Color, UA 06/26/2019 YELLOW  STRAW-YELL Final    Character, Urine 06/26/2019 CLOUDY* CLEAR-SL C Final    RBC, UA 06/26/2019 0-2  0-2/hpf /hpf Final    WBC, UA 06/26/2019 10-15  0-4/hpf /hpf Final    Epi Cells 06/26/2019 5-10  3-5/hpf /hpf Final    Mucus, UA 06/26/2019 THREADS  NONE SEEN/ Final    Bacteria, UA 06/26/2019 NONE  FEW/NONE S /hpf Final    Casts UA 06/26/2019 NONE SEEN  NONE SEEN /lpf Final    Crystals 06/26/2019 NONE SEEN  NONE SEEN Final    Renal Epithelial, Urine 06/26/2019 NONE SEEN  NONE SEEN Final    Yeast, UA 06/26/2019 NONE SEEN  NONE SEEN Final    CASTS 2 06/26/2019 NONE SEEN  NONE SEEN /lpf Final    MISCELLANEOUS 2 06/26/2019 NONE SEEN   Final    Performed at [facility], [address]    [address] 06/26/2019 NEGATIVE  NEGATIVE Final    Performed at [address] [facility], [address]    Urine Culture Reflex 06/26/2019 *  Final                    Value:No growth-preliminary  Mixed growth.   The mixture of organisms present represents  both organisms that may cause urinary tract infections and  organisms that are not a common cause of urinary tract  infections and are possibly skin shirlene or distal urethral  shirlene.  Organism 06/26/2019 Mixed Growth*  Final    Procalcitonin 06/26/2019 0.02  0.01 - 0.09 ng/mL Final    Comment: NORMAL: <0.1 ng/ml (infants > 72 hours - Adults)    Suspected SEPSIS: STRONGLY consider antibiotics in all UNSTABLE PATIENTS  0.1-0.49 ng/ml - Low likelihood of sepsis: Antibiotics discouraged  >=0.5 ng/ml    - Increased likelihood of sepsis: Antibiotics encouraged  >2.0 ng/ml     - High Risk of sepsis/shock: Antibiotics strongly encouraged      Suspected LOWER RESPIRATORY Tract Infections:  0.1-0.24 ng/ml - Low likelihood of bacterial inf: Antibiotics discouraged  >=0.25 ng/ml   - Increased likelihood of bacterial inf: Antibiotics                       encouraged  Performed at [address] [place] [address], [facility]      Anion Gap 06/26/2019 10.0  8.0 - 16.0 meq/L Final    Comment: ANION GAP = Sodium -(Chloride + CO2)  Performed at [address] [place] [address], [facility]      Fortino Double Filt Rate 06/26/2019 >90  ml/min/1.73m2 Final    Comment: Stage Description                    GFR, ml/min/1.73 m2   -   At increased risk               > or = 60 (with chronic                                       kidney disease risk factors)   1   Normal or increased GFR         > or = 90   2   Mildly or decreased GFR         60 - 89   3   Moderately decreased GFR        30 - 59   4   Severely decreased GFR          15 - 29   5   Kidney failure                  <15 (or dialysis)  Estimated GFR calculated using abbreviated MDRD formula as  recommended by [employer]. Calculation based  upon serum creatinine and adjusted for age, gender & race. [employer]. Internal Med., Vol. 139 (2) pg 137-147.   Performed at [facility], [facility]      Osmolality Calc 06/26/2019 271.1* 275.0 - 300 mOsmol/kg Final    Performed at [address] [facility], [facility]            Medications  Current Facility-Administered Medications: [START ON 7/1/2019] risperiDONE (RISPERDAL M-TABS) disintegrating tablet 0.5 mg, 0.5 mg, Oral, Nightly  hydrocortisone 1 % cream, , Topical, BID PRN  acetaminophen (TYLENOL) tablet 650 mg, 650 mg, Oral, Q4H PRN  hydrOXYzine (ATARAX) tablet 50 mg, 50 mg, Oral, TID PRN  magnesium hydroxide (MILK OF MAGNESIA) 400 MG/5ML suspension 30 mL, 30 mL, Oral, Daily PRN  nicotine (NICODERM CQ) 14 MG/24HR 1 patch, 1 patch, Transdermal, Daily  LORazepam (ATIVAN) injection 1 mg, 1 mg, Intramuscular, Q6H PRN    ASSESSMENT  Psychosis ([address]     PLAN  Patient s symptoms show no change  Will have risperdal M tab 0.5 mg at bedtime  Attempt to develop insight  Psycho-education conducted. Supportive Therapy conducted. Probable discharge is TBD  Follow-up TBD    Electronically signed by Lupillo Kramer MD on 6/30/19 at 8:55 PM      **This report has been created using voice recognition software. It may contain minor errors which are inherent in voice recognition technology. **

## 2019-07-01 NOTE — BH NOTE
Patient states that she wants to go to sleep. Asks if she can lay on the floor because her room has a window. States that she would like to place her mattress on the floor to lay on as well. Informed patient that she could do that if it would make her more comfortable. Patient states that she is going to put the mattress on the floor and go to sleep.

## 2019-07-01 NOTE — BH NOTE
PLAN OF CARE:     Start Time:  1000  End Time:   1045    Group Topic:  Crafts and music    Group Type:   Social/Recreation Group    Intervention/Goal:  To increase socialization and concentration. Attendance:  Attended group    Affect:  restricted    Behavior:  Inappropriate, intrusive and pacing around the group room during group. Response:  Patient appeared anxious and was unable to sit down in group and participate.

## 2019-07-01 NOTE — PROGRESS NOTES
Group Therapy Note    Date: 6/30/2019  Start Time: 2000  End Time:  2020    Type of Group: Wrap-Up/Relaxation    Patient's Goal:  No goal    Notes:  attended    Status After Intervention:  Unchanged    Participation Level:  Active Listener    Participation Quality: Attentive      Speech:  normal      Thought Process/Content: Delusional      Affective Functioning: Blunted      Mood: anxious      Level of consciousness:  Alert      Response to Learning: Able to verbalize current knowledge/experience      Endings: None Reported    Modes of Intervention: Support      Discipline Responsible: Registered Nurse      Signature:  Juan F Vinson RN

## 2019-07-01 NOTE — PLAN OF CARE
Care plan reviewed with patient . Patient verbalizes understanding of the plan of care and contributes to goal setting.

## 2019-07-01 NOTE — PROGRESS NOTES
Request from Cameron Miller with [facility] that patient's appointment for 7/5/19 (DA at [facility]) be cancelled due to patient's acuity of symptoms. Cameron Miller will reschedule appointment when she returns to  on Wednesday (7/3). SW did not find appointment listed in follow-up section.
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
## 2019-07-01 NOTE — PLAN OF CARE
time, will continue to monitor     Problem: Altered Mood, Psychotic Behavior:  Goal: Able to demonstrate trust by eating, participating in treatment and following staff's direction  Description  Able to demonstrate trust by eating, participating in treatment and following staff's direction  6/30/2019 2307 by Ethan Ascencio RN  Outcome: Ongoing  Note:   Ate snack this shift, but drank a lot of water and then vomited it up  6/30/2019 1237 by Rosa King RN  Outcome: Ongoing  Note:   Pt eating 25 - 50% of meals, going to some groups, follows some directions  Goal: Compliance with prescribed medication regimen will improve  Description  Compliance with prescribed medication regimen will improve  6/30/2019 2307 by Ethan Ascencio RN  Outcome: Ongoing  6/30/2019 1237 by Rosa King RN  Outcome: Ongoing  Note:   Refused medication, orders modified later     Problem: Discharge Planning:  Goal: Discharged to appropriate level of care  Description  Discharged to appropriate level of care  6/30/2019 2307 by Ethan Ascencio RN  Outcome: Ongoing  Note:   Works with an interdisciplinary team towards meeting discharge needs. 6/30/2019 1237 by Rosa King RN  Outcome: Not Met This Shift  Note:   Discharge planners working with patient to achieve optimal discharge plan, specific to the needs of this patient. Problem: Coping:  Goal: Ability to cope will improve  Description  Ability to cope will improve  6/30/2019 2307 by Ethan Ascencio RN  Outcome: Ongoing  6/30/2019 1237 by Rosa King RN  Outcome: Ongoing  Note:   Pt has poor coping skills, laughs when this nurse discuses them     Problem: Nutrition  Goal: Optimal nutrition therapy  6/30/2019 2307 by Ethan Ascencio RN  Outcome: Ongoing  6/30/2019 1237 by Rosa King RN  Outcome: Ongoing  Note:   Patient eating 25-50% of meals today. Encouraged patient to drink supplements for increase calorie intake.       Problem: Altered Mood, Psychotic Behavior:  Goal: Able to verbalize reality based thinking  Description  Able to verbalize reality based thinking  6/30/2019 2307 by Wilton Moody RN  Outcome: Not Met This Shift  Note:   Remains paranoid and suspicious  6/30/2019 1237 by Ann Bashir RN  Outcome: Ongoing  Note:   Able to verbalize some reality based thinking     Problem: KNOWLEDGE DEFICIT,EDUCATION,DISCHARGE PLAN  Goal: Knowledge - personal safety  6/30/2019 2307 by Wilton Moody RN  Outcome: Not Met This Shift  Note:   Did not complete a safety plan this shift. 6/30/2019 1237 by Ann Bashir RN  Outcome: Ongoing  Note:   Maintained in safe and secure environment. Patient did not fill out safety plan this shift. Care plan reviewed with patient. Patient verbalize understanding of the plan of care and contribute to goal setting.

## 2019-07-01 NOTE — PROGRESS NOTES
Left message with Javid Davis at [facility] requesting to be informed of court date for patient hearing. 07/01/19 10:59 AM  Call back from Javid Davis who reports a date is not yet scheduled reports she will call atty and will likely be scheduled for Wednesday morning. She will call back when she is able to confirm. 07/01/19  1:34 PM  PC to Javid Davis to inform her that patient signed a voluntary and court date is no longer needed, left message with this detail. 07/01/19 2:31 PM  PC back from Javid Davis who requests copy of Voluntary faxed to her [phone] once signed by doctor. Doctor left for the day will sign when he returns. Left note for 7/2/19  to complete tomorrow.

## 2019-07-01 NOTE — GROUP NOTE
Group Therapy Note    Date: July 1    Group Start Time: 1105  Group End Time: 1140  Group Topic: Psychotherapy    [facility]    Radha Cervantes, [address]        Group Therapy Note    Attendees: 6    Notes: Group members processed recent and current stressors and possible barriers to a smooth discharge. Patient was able to remain in group for longer periods of time. She attempted to be supportive of an upset peer, followed cues well. Status After Intervention:  Improved    Participation Level:  Active Listener and Interactive    Participation Quality: Attentive, Sharing and Supportive      Speech:  normal      Thought Process/Content: Logical      Affective Functioning: Congruent      Mood: elevated      Level of consciousness:  Alert, Oriented x4 and Attentive      Response to Learning: Able to verbalize current knowledge/experience, Able to verbalize/acknowledge new learning, Able to retain information, Capable of insight, Able to change behavior and Progressing to goal      Endings: None Reported    Modes of Intervention: Education, Support, Socialization, Exploration, Clarifying and Problem-solving      Discipline Responsible: /Counselor      Signature:  SEB Trejo

## 2019-07-02 PROCEDURE — 99231 SBSQ HOSP IP/OBS SF/LOW 25: CPT | Performed by: PSYCHIATRY & NEUROLOGY

## 2019-07-02 PROCEDURE — 90833 PSYTX W PT W E/M 30 MIN: CPT | Performed by: PSYCHIATRY & NEUROLOGY

## 2019-07-02 PROCEDURE — 6370000000 HC RX 637 (ALT 250 FOR IP): Performed by: PSYCHIATRY & NEUROLOGY

## 2019-07-02 PROCEDURE — 1240000000 HC EMOTIONAL WELLNESS R&B

## 2019-07-02 RX ADMIN — RISPERIDONE 0.5 MG: 0.5 TABLET, ORALLY DISINTEGRATING ORAL at 21:15

## 2019-07-02 ASSESSMENT — PAIN SCALES - GENERAL
PAINLEVEL_OUTOF10: 0
PAINLEVEL_OUTOF10: 0

## 2019-07-02 NOTE — PLAN OF CARE
Problem: Altered Mood, Psychotic Behavior:  Goal: Able to demonstrate trust by eating, participating in treatment and following staff's direction  Description  Able to demonstrate trust by eating, participating in treatment and following staff's direction  Outcome: Ongoing  Note:   Patient is eating meals without difficulty. Patient is participating in treatment but is intrusive and inappropriate at times during group activities. Patient able to follows staff's directions. Goal: Able to verbalize reality based thinking  Description  Able to verbalize reality based thinking  Outcome: Ongoing  Note:   Patient continues to struggle with verbalization of reality based thinking. Patient continues to appear paranoid and suspicious towards staff. Goal: Absence of self-harm  Description  Absence of self-harm  Outcome: Ongoing  Note:   Patient remains free from self-harming behavior at this time during shift. Goal: Ability to interact with others will improve  Description  Ability to interact with others will improve  Outcome: Ongoing  Note:   Patient able to interact with others. At times she is sexually inappropriate towards males who are on unit and needs to be redirected. Goal: Compliance with prescribed medication regimen will improve  Description  Compliance with prescribed medication regimen will improve  Outcome: Ongoing  Note:   Patient refused nicotine patch during shift. Problem: Discharge Planning:  Goal: Discharged to appropriate level of care  Description  Discharged to appropriate level of care  Outcome: Ongoing  Note:   No discharge plans noted at this time during shift. Problem: Coping:  Goal: Ability to cope will improve  Description  Ability to cope will improve  Outcome: Ongoing  Note:   Patient unable to verbalize effective coping skills at this time during shift. States, \"the regular ones you know what I'm saying\" when she was questioned on what coping skills help her with stressors. Problem: Anxiety:  Goal: Level of anxiety will decrease  Description  Level of anxiety will decrease  Outcome: Ongoing  Note:   Unable to rate anxiety, patient stated, \"good, good, good\" when asked if she was anxious. Patient appears to be having anxiety symptoms throughout the shift. PRN medication offered but refused. Problem: Activity:  Goal: Sleeping patterns will improve  Description  Sleeping patterns will improve  Outcome: Ongoing  Note:   Previous shift reports patient sleeping throughout the night. Per charting patient slept 7 hours continuously. Problem: Nutrition  Goal: Optimal nutrition therapy  Outcome: Ongoing  Note:   Good appetite noted during shift. Problem: Safety:  Goal: Risk of elopement will decrease  Description  Risk of elopement will decrease  Outcome: Ongoing  Note:   Patient has not tried any behaviors to pull doors, or leave unit at this time during shift. Goal: Ability to remain free from injury will improve  Description  Ability to remain free from injury will improve  Outcome: Ongoing  Note:   Patient has remained free from injury at this time during shift. Problem: KNOWLEDGE DEFICIT,EDUCATION,DISCHARGE PLAN  Goal: Knowledge - personal safety  Outcome: Ongoing  Note:   Patient did not complete safety plan at this time during shift. Problem: Agitation  Goal: Decrease in feelings of agitation  Outcome: Ongoing  Note:   No signs of agitation noted during shift. Problem: Social interaction  Goal: Increased social interaction  7/2/2019 1437 by Ricardo Delaney  Note:   Patient has attended and participated in at least 2 groups this shift and has also been out of her room for socialization with others so she has met her socialization goal for today. Care plan reviewed with patient.   Patient does not verbalize understanding of the plan of care and does not contribute to goal setting

## 2019-07-02 NOTE — BH NOTE
Patient verbalized consent to talk to mother and invite her to come and visit during visiting hours. This writer explained to patient that I need her to sign consents for her to allow visitors and calls. Patient after having consent explained to her several times signed for anyone to visit but refused to sign for plan of care. This writer then spoke to patient's mother, explained to her that patient is accepting visitors, but I am unable to provide any information regarding what is going on with her due to her not signing consents to get information. Mother verbalized understanding and states she will be here at 5pm to visit.

## 2019-07-02 NOTE — PROGRESS NOTES
Department of Psychiatry  Progress Note     Chief Complaint:  Psychosis [facility])     SUBJECTIVE:      Star Phan did take her Risperdal last night. However, she continues to be inappropriate, evasive on exam.  She repeatedly interrupts sessions with other patients by knocking on the door and walking by the room singing loudly. She and will not permit us to speak with her family. She wants discharge. OBJECTIVE      Medications  Current Facility-Administered Medications: risperiDONE (RISPERDAL M-TABS) disintegrating tablet 0.5 mg, 0.5 mg, Oral, Nightly  hydrocortisone 1 % cream, , Topical, BID PRN  acetaminophen (TYLENOL) tablet 650 mg, 650 mg, Oral, Q4H PRN  hydrOXYzine (ATARAX) tablet 50 mg, 50 mg, Oral, TID PRN  magnesium hydroxide (MILK OF MAGNESIA) 400 MG/5ML suspension 30 mL, 30 mL, Oral, Daily PRN  nicotine (NICODERM CQ) 14 MG/24HR 1 patch, 1 patch, Transdermal, Daily  LORazepam (ATIVAN) injection 1 mg, 1 mg, Intramuscular, Q6H PRN     Physical     height is 5' 6\" (1.676 m) and weight is 124 lb 8 oz (56.5 kg). Her oral temperature is 96.7 °F (35.9 °C). Her blood pressure is 123/70 and her pulse is 108. Her respiration is 16 and oxygen saturation is 99%. Lab Results   Component Value Date    WBC 7.8 06/26/2019    HGB 12.8 06/26/2019    HCT 37.7 06/26/2019     06/26/2019    ALT 9 (L) 06/26/2019    AST 19 06/26/2019     (L) 06/26/2019    K 3.4 (L) 06/26/2019     06/26/2019    CREATININE 0.7 06/26/2019    BUN 13 06/26/2019    CO2 21 (L) 06/26/2019    TSH 1.500 06/26/2019          Mental Status Examination:    Level of consciousness:  Within normal limits  Appearance: seated in chair, good grooming  Behavior/Motor: Intrusive  Gait: no abnormalities noted.   Attitude toward examiner: Evasive, good eye contact   speech: Singsongy  mood: Irritable  Affect: reactive  Thought processes:  intermittent loose associations   thought content: Suicidal ideation:  denies         Homicidal

## 2019-07-02 NOTE — PLAN OF CARE
Problem: Altered Mood, Psychotic Behavior:  Goal: Able to demonstrate trust by eating, participating in treatment and following staff's direction  Description  Able to demonstrate trust by eating, participating in treatment and following staff's direction  7/2/2019 0022 by Romulo Drummond LPN  Outcome: Ongoing  Note:   Patient is not redirectable at times. 7/1/2019 1714 by Heike Clark RN  Outcome: Ongoing  7/1/2019 1708 by Heike Clark RN  Outcome: Ongoing  Note:   Impulsive, follows staff directions. Goal: Able to verbalize reality based thinking  Description  Able to verbalize reality based thinking  7/2/2019 0022 by Romulo Drummond LPN  Outcome: Not Met This Shift  Note:   Patient is not able to verbalize reality based thinking. 7/1/2019 1714 by Heike Clark RN  Outcome: Ongoing  7/1/2019 1708 by Heike Clark RN  Outcome: Ongoing  Note:   Ongoing   Goal: Absence of self-harm  Description  Absence of self-harm  7/2/2019 0022 by Romulo Drummond LPN  Outcome: Met This Shift  Note:   Patient was free of self harm. 7/1/2019 1714 by Heike Clark RN  Outcome: Ongoing  7/1/2019 1708 by Heike Clark RN  Outcome: Ongoing  Note:   Denies intent to harm self  Goal: Ability to interact with others will improve  Description  Ability to interact with others will improve  7/2/2019 0022 by Romulo Drummond LPN  Outcome: Ongoing  Note:   Patient can be intrusive peers. Patient sexually inappropriate with male peers on the unit. 7/1/2019 1714 by Heike Clark RN  Outcome: Ongoing  7/1/2019 1708 by Heike Clark RN  Outcome: Ongoing  Note:   Minimal peer interactions. Goal: Compliance with prescribed medication regimen will improve  Description  Compliance with prescribed medication regimen will improve  7/2/2019 0022 by Romulo Drummond LPN  Outcome: Met This Shift  Note:   Patient was compliant with medications.    7/1/2019 1714 by Heike Clark RN  Outcome: Ongoing  7/1/2019 1708 by Aime Knight DAYANNA Perez  Outcome: Ongoing  Note:   ongoing  Intervention: Assess psychotic behavior  7/1/2019 1708 by Soheila Morrison RN  Note:   Layed down on on floor in dining area. Peers stated that patient did not fall and actually just layed down on the floor. Intervention: Assess behavior for possible injury to self  7/1/2019 1708 by Soheila Morrison RN  Note:   No injury to self     Problem: Discharge Planning:  Goal: Discharged to appropriate level of care  Description  Discharged to appropriate level of care  7/2/2019 0022 by [id number], LPN  Outcome: Ongoing  Note:   Discharge planning in process. 7/1/2019 1714 by Soheila Morrison RN  Outcome: Ongoing  7/1/2019 1708 by Soheila Morrison RN  Outcome: Ongoing  Note:   ongoing     Problem: Coping:  Goal: Ability to cope will improve  Description  Ability to cope will improve  7/2/2019 0022 by [id number], LPN  Outcome: Ongoing  7/1/2019 1714 by Soheila Morrison RN  Outcome: Ongoing  7/1/2019 1708 by Soheila Morrison RN  Outcome: Ongoing     Problem: Coping:  Goal: Ability to cope will improve  Description  Ability to cope will improve  7/2/2019 0022 by [address], LPN  Outcome: Ongoing  7/1/2019 1714 by Soheila Morrison RN  Outcome: Ongoing  7/1/2019 1708 by Soheila Morrison RN  Outcome: Ongoing     Problem: Anxiety:  Goal: Level of anxiety will decrease  Description  Level of anxiety will decrease  7/2/2019 0022 by [address], LPN  Outcome: Ongoing  Note:   Patient denies any anxiety. 7/1/2019 1714 by Soheila Morrison RN  Outcome: Ongoing  7/1/2019 1708 by Soheila Morrison RN  Outcome: Ongoing  Note:   Mild anxiety noted. Intervention: Promote participation in care  7/1/2019 1708 by Soheila Morrison RN  Note:   Care plan reviewed with patient . Patient verbalizes understanding of the plan of care and contributes to goal setting. Problem:  Activity:  Goal: Sleeping patterns will improve  Description  Sleeping patterns will improve  7/2/2019 0022 Music, LPN  Outcome: Ongoing  Note:   Patient showed very minimal agitation this shift. 7/1/2019 1714 by Nhi Last RN  Outcome: Ongoing  7/1/2019 1708 by Nhi Last RN  Outcome: Ongoing     Problem: Social interaction  Goal: Increased social interaction  7/1/2019 1433 by Lan Moses  Outcome: Ongoing   Care plan reviewed with patient. Patient does verbalize understanding of the plan of care and does not contribute to goal setting.

## 2019-07-03 PROCEDURE — 99231 SBSQ HOSP IP/OBS SF/LOW 25: CPT | Performed by: PSYCHIATRY & NEUROLOGY

## 2019-07-03 PROCEDURE — 1240000000 HC EMOTIONAL WELLNESS R&B

## 2019-07-03 PROCEDURE — 6370000000 HC RX 637 (ALT 250 FOR IP): Performed by: PSYCHIATRY & NEUROLOGY

## 2019-07-03 RX ORDER — RISPERIDONE 1 MG/1
1 TABLET, ORALLY DISINTEGRATING ORAL 2 TIMES DAILY
Status: DISCONTINUED | OUTPATIENT
Start: 2019-07-03 | End: 2019-07-08 | Stop reason: HOSPADM

## 2019-07-03 RX ORDER — DIVALPROEX SODIUM 250 MG/1
250 TABLET, EXTENDED RELEASE ORAL 2 TIMES DAILY
Status: DISCONTINUED | OUTPATIENT
Start: 2019-07-03 | End: 2019-07-07

## 2019-07-03 RX ADMIN — RISPERIDONE 1 MG: 1 TABLET, ORALLY DISINTEGRATING ORAL at 20:57

## 2019-07-03 RX ADMIN — DIVALPROEX SODIUM 250 MG: 250 TABLET, EXTENDED RELEASE ORAL at 20:57

## 2019-07-03 ASSESSMENT — PAIN SCALES - GENERAL
PAINLEVEL_OUTOF10: 0
PAINLEVEL_OUTOF10: 0

## 2019-07-03 NOTE — PLAN OF CARE
Problem: Altered Mood, Psychotic Behavior:  Goal: Able to demonstrate trust by eating, participating in treatment and following staff's direction  Description  Able to demonstrate trust by eating, participating in treatment and following staff's direction  7/3/2019 0210 by Kobe Jordan LPN  Outcome: Ongoing  Note:   Patient did follow staff directions this evening. 7/2/2019 1450 by Nish Maurer LPN  Outcome: Ongoing  Note:   Patient is eating meals without difficulty. Patient is participating in treatment but is intrusive and inappropriate at times during group activities. Patient able to follows staff's directions. Goal: Able to verbalize reality based thinking  Description  Able to verbalize reality based thinking  7/3/2019 0210 by Kobe Jordan LPN  Outcome: Not Met This Shift  Note:   Patient unable to verbalize reality based thinking. Patient reports that she is pregnant with triplets by three different fathers. 7/2/2019 1450 by Nish Maurer LPN  Outcome: Ongoing  Note:   Patient continues to struggle with verbalization of reality based thinking. Patient continues to appear paranoid and suspicious towards staff. Goal: Absence of self-harm  Description  Absence of self-harm  7/3/2019 0210 by Kobe Jordan LPN  Outcome: Met This Shift  Note:   Patient was free of self harm. 7/2/2019 1450 by Nish Maurer LPN  Outcome: Ongoing  Note:   Patient remains free from self-harming behavior at this time during shift. Goal: Ability to interact with others will improve  Description  Ability to interact with others will improve  7/3/2019 0210 by Kobe Jordan LPN  Outcome: Ongoing  Note:   Patient can be very intrusive/inappropriate with peers. 7/2/2019 1450 by Nish Maurer LPN  Outcome: Ongoing  Note:   Patient able to interact with others. At times she is sexually inappropriate towards males who are on unit and needs to be redirected.   Goal: Compliance with prescribed medication regimen

## 2019-07-03 NOTE — PROGRESS NOTES
Group Therapy Note    Date: 7/2/2019  Start Time: 2000  End Time:  2030  Number of Participants:     Type of Group: Wrap-Up    Wellness Binder Information  Module Name:    Session Number:      Patient's Goal:  To get some sleep    Notes:  Patient reports that she did meet her goal for the day.     Status After Intervention:  Unchanged    Participation Level: Minimal    Participation Quality: Intrusive      Speech:  normal      Thought Process/Content: Delusional      Affective Functioning: Flat      Mood: anxious      Level of consciousness:  Alert      Response to Learning: Capable of insight      Endings: None Reported    Modes of Intervention: Support      Discipline Responsible: Licensed Practical Nurse      Signature:  Aicha Barrios LPN

## 2019-07-03 NOTE — PLAN OF CARE
Problem: Nutrition  Goal: Optimal nutrition therapy  7/3/2019 1049 by Abner Russ, SUE, LD  Outcome: Ongoing   Nutrition Problem: Inadequate oral intake  Intervention: Food and/or Nutrient Delivery: Continue current diet, Continue current ONS  Nutritional Goals: Pt will consume 75% or more of meals during LOS.

## 2019-07-03 NOTE — PROGRESS NOTES
Independent/Self    Patient Monitoring:  Frequency of Checks: 4 times per hour, close    Psychiatric Symptoms:   Depression Symptoms  Depression Symptoms: No problems reported or observed. Anxiety Symptoms  Anxiety Symptoms: Generalized  Mariana Symptoms  Mariana Symptoms: Poor judgment     Psychosis Symptoms  Delusion Type: Paranoid    Suicide Risk CSSR-S:  1) Within the past month, have you wished you were dead or wished you could go to sleep and not wake up? : No  2) Have you actually had any thoughts of killing yourself? : No  6) Have you ever done anything, started to do anything, or prepared to do anything to end your life?: No  Change in Result No Change in Plan of care No      EDUCATION:   Learner Progress Toward Treatment Goals: Reviewed results and recommendations of this team, Reviewed group plan and strategies, Reviewed signs, symptoms and risk of self harm and violent behavior and Reviewed goals and plan of care    Method: Individual    Outcome: Verbalized understanding and Needs reinforcement    PATIENT GOALS: \"to get help\"    PLAN/TREATMENT RECOMMENDATIONS UPDATE:  1. Are there any lingering problems that need to be addressed? No  2. Discharge plans that are set-up or in process Patient will return to her home and has access to transportation.      GOALS UPDATE:   Time frame for Short-Term Goals: daily      SEB Ngo

## 2019-07-03 NOTE — BH NOTE
PLAN OF CARE:     Start Time: 0900  End Time:   0935    Group Topic:  Daily Goals    Group Type:   Goal Group    Intervention/Goal:  To increase support and identify daily goals    Attendance: attended group     Affect:  restricted    Behavior:  Guarded and anxious    Response: Patient was unable to identify a goal for today.      Daily Goal:  No goal    Progress:  No progress

## 2019-07-03 NOTE — BH NOTE
Group Therapy Note    Date: 7/3/2019  Start Time:  1000  End Time:   1100  Number of Participants:  7    Type of Group: Recreational/Self-Expression/Social/Relaxation    Patient's Goal:   Increase socialization, relaxation and self-expression. Notes:   Patient participated in [employer] and did answer some questions appropriately. Patient was social with others at times. Patient appeared to enjoy listening to Dona, the volunteer, play the piano.      Status After Intervention:  Improved    Participation Level: Interactive    Participation Quality: Attentive and Sharing      Speech:  hesitant      Thought Process/Content: Logical      Affective Functioning: Congruent      Mood: euthymic      Level of consciousness:  Alert and Attentive      Response to Learning: Progressing to goal      Endings: None Reported    Modes of Intervention: Support, Socialization, Exploration and Activity      Discipline Responsible: Psychoeducational Specialist      Signature:  Helga Hernandez

## 2019-07-04 PROCEDURE — 6370000000 HC RX 637 (ALT 250 FOR IP): Performed by: PSYCHIATRY & NEUROLOGY

## 2019-07-04 PROCEDURE — 99232 SBSQ HOSP IP/OBS MODERATE 35: CPT | Performed by: PHYSICIAN ASSISTANT

## 2019-07-04 PROCEDURE — 1240000000 HC EMOTIONAL WELLNESS R&B

## 2019-07-04 RX ADMIN — RISPERIDONE 1 MG: 1 TABLET, ORALLY DISINTEGRATING ORAL at 08:18

## 2019-07-04 RX ADMIN — DIVALPROEX SODIUM 250 MG: 250 TABLET, EXTENDED RELEASE ORAL at 21:06

## 2019-07-04 RX ADMIN — DIVALPROEX SODIUM 250 MG: 250 TABLET, EXTENDED RELEASE ORAL at 08:17

## 2019-07-04 RX ADMIN — RISPERIDONE 1 MG: 1 TABLET, ORALLY DISINTEGRATING ORAL at 21:06

## 2019-07-04 ASSESSMENT — PAIN SCALES - GENERAL
PAINLEVEL_OUTOF10: 0
PAINLEVEL_OUTOF10: 0

## 2019-07-04 NOTE — GROUP NOTE
Group Therapy Note    Date: July 4    Group Start Time: 1330  Group End Time: 1406  Group Topic: Cognitive Skills    [facility]    JACOBY Valladares        Group Therapy Note            Notes:  Group discussion consisted of identifying positive self qualities and positive coping skills, the benefits of owning a pet, sharing the best advice pt has been given, and what pt is looking forward to/what are their long-term goals in life. Pt attended group but left early and did not participate.      Status After Intervention:  Unchanged    Participation Level: Minimal    Participation Quality: Resistant      Speech:  mute      Thought Process/Content: Logical      Affective Functioning: Flat      Mood: euthymic      Level of consciousness:  Drowsy      Response to Learning: Resistant      Endings: None Reported    Modes of Intervention: Education, Support, Socialization, Exploration, Clarifying and Problem-solving      Discipline Responsible: /Counselor      Signature:  JACOBY Valladares

## 2019-07-04 NOTE — PROGRESS NOTES
Department of Psychiatry  Progress Note     Chief Complaint:  Psychosis [facility])     SUBJECTIVE:      Solange Clarke is seen today for follow up care. She did take her dose of Risperdal last night and this morning, in addition to the Depakote. She is slightly more appropriate on exam, still evasive, dismissive, irritable. She laughs inappropriately as if she has an inside joke with her self. No harm behaviors, sleeping fair. Asked about discharge. OBJECTIVE    Medications  Current Facility-Administered Medications: risperiDONE (RISPERDAL M-TABS) disintegrating tablet 1 mg, 1 mg, Oral, BID  divalproex (DEPAKOTE ER) extended release tablet 250 mg, 250 mg, Oral, BID  hydrocortisone 1 % cream, , Topical, BID PRN  acetaminophen (TYLENOL) tablet 650 mg, 650 mg, Oral, Q4H PRN  hydrOXYzine (ATARAX) tablet 50 mg, 50 mg, Oral, TID PRN  magnesium hydroxide (MILK OF MAGNESIA) 400 MG/5ML suspension 30 mL, 30 mL, Oral, Daily PRN  nicotine (NICODERM CQ) 14 MG/24HR 1 patch, 1 patch, Transdermal, Daily  LORazepam (ATIVAN) injection 1 mg, 1 mg, Intramuscular, Q6H PRN     Physical     height is 5' 6\" (1.676 m) and weight is 124 lb 8 oz (56.5 kg). Her oral temperature is 97.7 °F (36.5 °C). Her blood pressure is 103/59 (abnormal) and her pulse is 87. Her respiration is 16 and oxygen saturation is 97%. Lab Results   Component Value Date    WBC 7.8 06/26/2019    HGB 12.8 06/26/2019    HCT 37.7 06/26/2019     06/26/2019    ALT 9 (L) 06/26/2019    AST 19 06/26/2019     (L) 06/26/2019    K 3.4 (L) 06/26/2019     06/26/2019    CREATININE 0.7 06/26/2019    BUN 13 06/26/2019    CO2 21 (L) 06/26/2019    TSH 1.500 06/26/2019      Mental Status Examination:    Level of consciousness:  Within normal limits  Appearance: Hospital attire, lying in bed, fair grooming   behavior/Motor: No motor retardation  Gait: no abnormalities noted.   Attitude toward examiner: Evasive, suspicious, minimal eye contact   speech: sing-songy  mood: Labile  Affect: Blunted  Thought processes:  intermittent loose associations   thought content: Suicidal ideation:  denies         Homicidal ideations: denies            Hallucinations: denies         Delusions: Paranoid  Cognition: Oriented x3, not to situation  Memory: Unable to formally assess due to patient cooperation  Insight and Judgement are limited  Attention and concentration are limited      ASSESSMENT     Psychosis ([facility] [place] [address]     PLAN  Continue medications as adjusted yesterday, this regimen is starting to demonstrate efficacy  Patient's symptoms remain present and in market severity  She continues to refuse voluntary admission. Initiate probate process for continued admission and compelled medications. Staff to obtain records from her outpatient provider in [facility]. Attempt to develop insight, psycho-education and supportive therapy conducted. Electronically signed by Rina Boateng PA-C on 7/4/2019 at 12:54 PM     I assessed this patient and [facility] I have reviewed the above documentation and I agree with the findings and treatment plan as written      Patient says she is doing better and her\" head is getting clearer than before. \" Says she plans to go and stay with her friend on discharge rather than live with her sister.   sa  Electronically signed by Logan Chaparro on 7/4/2019 at 1:45 PM

## 2019-07-04 NOTE — BH NOTE
Pt makes inappropriate sexual statements towards male patients, pt gravitates towards males. Pt was refusing medications but then agreed to take if she was given education on the medications which was completed and she took the medications.  Pt mother called and states that pt pointed a book in [employer] that states not to trust people and to keep secrets

## 2019-07-05 PROCEDURE — 99231 SBSQ HOSP IP/OBS SF/LOW 25: CPT | Performed by: PSYCHIATRY & NEUROLOGY

## 2019-07-05 PROCEDURE — 6370000000 HC RX 637 (ALT 250 FOR IP): Performed by: PSYCHIATRY & NEUROLOGY

## 2019-07-05 PROCEDURE — 1240000000 HC EMOTIONAL WELLNESS R&B

## 2019-07-05 RX ADMIN — RISPERIDONE 1 MG: 1 TABLET, ORALLY DISINTEGRATING ORAL at 21:23

## 2019-07-05 RX ADMIN — DIVALPROEX SODIUM 250 MG: 250 TABLET, EXTENDED RELEASE ORAL at 07:40

## 2019-07-05 RX ADMIN — RISPERIDONE 1 MG: 1 TABLET, ORALLY DISINTEGRATING ORAL at 07:40

## 2019-07-05 RX ADMIN — DIVALPROEX SODIUM 250 MG: 250 TABLET, EXTENDED RELEASE ORAL at 21:22

## 2019-07-05 ASSESSMENT — PAIN SCALES - GENERAL
PAINLEVEL_OUTOF10: 0
PAINLEVEL_OUTOF10: 0

## 2019-07-05 NOTE — PROGRESS NOTES
Labile  Affect: able to smile today   Thought processes:  intermittent loose associations   thought content: Suicidal ideation:  denies         Homicidal ideations: denies            Hallucinations: denies         Delusions: Paranoid  Cognition: Oriented x3, not to situation  Memory: Unable to formally assess due to patient cooperation  Insight and Judgement are limited  Attention and concentration are limited      ASSESSMENT     Psychosis ([facility] [address]     PLAN  Continue medications as adjusted yesterday, this regimen is starting to demonstrate efficacy  Encouraged to attend groups    Will consider for discharge in 1-2 days if she continues to improve      Electronically signed by Piyush Pike MD on 7/5/2019 at 10:04 AM

## 2019-07-05 NOTE — PROGRESS NOTES
Group Therapy Note    Date: 7/4/2019  Start Time: 2000  End Time:  2020      Type of Group: Relaxation        Patient's Goal:  No goal    Notes:  Out on the unit.     Status After Intervention:  Unchanged    Participation Level: Minimal    Participation Quality: Intrusive      Speech:  normal      Thought Process/Content: Linear      Affective Functioning: Flat      Mood: irritable      Level of consciousness:  Alert      Response to Learning: Able to change behavior      Endings: None Reported    Modes of Intervention: Support and Socialization      Discipline Responsible: Registered Nurse      Signature:  Montana Haney RN

## 2019-07-06 PROCEDURE — 6370000000 HC RX 637 (ALT 250 FOR IP): Performed by: PSYCHIATRY & NEUROLOGY

## 2019-07-06 PROCEDURE — 1240000000 HC EMOTIONAL WELLNESS R&B

## 2019-07-06 PROCEDURE — 99231 SBSQ HOSP IP/OBS SF/LOW 25: CPT | Performed by: PSYCHIATRY & NEUROLOGY

## 2019-07-06 RX ADMIN — DIVALPROEX SODIUM 250 MG: 250 TABLET, EXTENDED RELEASE ORAL at 09:01

## 2019-07-06 RX ADMIN — RISPERIDONE 1 MG: 1 TABLET, ORALLY DISINTEGRATING ORAL at 21:07

## 2019-07-06 RX ADMIN — RISPERIDONE 1 MG: 1 TABLET, ORALLY DISINTEGRATING ORAL at 09:01

## 2019-07-06 RX ADMIN — DIVALPROEX SODIUM 250 MG: 250 TABLET, EXTENDED RELEASE ORAL at 21:07

## 2019-07-06 ASSESSMENT — PAIN SCALES - GENERAL
PAINLEVEL_OUTOF10: 0
PAINLEVEL_OUTOF10: 0

## 2019-07-06 NOTE — PROGRESS NOTES
Group Therapy Note    Date: 7/5/2019  Start Time: 2000  End Time:  2020      Type of Group: Relaxation      Patient's Goal:  No goal  Notes:  Met    Status After Intervention:  Improved    Participation Level:  Active Listener    Participation Quality: Appropriate and Attentive      Speech:  normal      Thought Process/Content: Logical      Affective Functioning: Congruent      Mood: anxious      Level of consciousness:  Alert      Response to Learning: Able to verbalize current knowledge/experience      Endings: None Reported    Modes of Intervention: Support and Socialization      Discipline Responsible: Registered Nurse      Signature:  Livier Milligan RN

## 2019-07-06 NOTE — BH NOTE
Nurse Visit - Drug Screen Only     Pt declined to attend [employer] group therapy session. Pt was provided maximum verbal encouragement to attend group therapy session, pt remained resting in her room during session.

## 2019-07-06 NOTE — PLAN OF CARE
Care plan reviewed with patient. Patient does not verbalize understanding of the plan of care and does not contribute to goal setting. Problem: Altered Mood, Psychotic Behavior:  Goal: Absence of self-harm  Description  Absence of self-harm  Outcome: Met This Shift  Note:   Patient remains free from self harm this shift. Goal: Compliance with prescribed medication regimen will improve  Description  Compliance with prescribed medication regimen will improve  Outcome: Met This Shift  Note:   Patient took her medications at bedtime as prescribed although needed to open the packages herself before taking them. Problem: Safety:  Goal: Risk of elopement will decrease  Description  Risk of elopement will decrease  Outcome: Met This Shift  Note:   No attempts made to elope the unit this shift. Goal: Ability to remain free from injury will improve  Description  Ability to remain free from injury will improve  Outcome: Met This Shift  Note:   Patient remains fee from injury this shift. Problem: Altered Mood, Psychotic Behavior:  Goal: Able to demonstrate trust by eating, participating in treatment and following staff's direction  Description  Able to demonstrate trust by eating, participating in treatment and following staff's direction  Outcome: Ongoing  Note:   Patient is progressing toward participating in the unit program and less resistive with care given  Goal: Ability to interact with others will improve  Description  Ability to interact with others will improve  Outcome: Ongoing  Note:   Patient interacts with select peers. Problem: Discharge Planning:  Goal: Discharged to appropriate level of care  Description  Discharged to appropriate level of care  Outcome: Ongoing  Note:   Discharge planning is in process. Problem: KNOWLEDGE DEFICIT,EDUCATION,DISCHARGE PLAN  Goal: Knowledge - personal safety  Outcome: Ongoing  Note:   Patient is working toward establishing a safety plan for discharge.

## 2019-07-07 PROCEDURE — 1240000000 HC EMOTIONAL WELLNESS R&B

## 2019-07-07 PROCEDURE — 6370000000 HC RX 637 (ALT 250 FOR IP): Performed by: PSYCHIATRY & NEUROLOGY

## 2019-07-07 PROCEDURE — 99232 SBSQ HOSP IP/OBS MODERATE 35: CPT | Performed by: PSYCHIATRY & NEUROLOGY

## 2019-07-07 RX ORDER — DIVALPROEX SODIUM 250 MG/1
250 TABLET, EXTENDED RELEASE ORAL NIGHTLY
Status: DISCONTINUED | OUTPATIENT
Start: 2019-07-08 | End: 2019-07-08 | Stop reason: HOSPADM

## 2019-07-07 RX ADMIN — RISPERIDONE 1 MG: 1 TABLET, ORALLY DISINTEGRATING ORAL at 21:09

## 2019-07-07 RX ADMIN — DIVALPROEX SODIUM 250 MG: 250 TABLET, EXTENDED RELEASE ORAL at 07:58

## 2019-07-07 RX ADMIN — RISPERIDONE 1 MG: 1 TABLET, ORALLY DISINTEGRATING ORAL at 07:58

## 2019-07-07 ASSESSMENT — PAIN SCALES - GENERAL
PAINLEVEL_OUTOF10: 0
PAINLEVEL_OUTOF10: 0

## 2019-07-07 NOTE — BH NOTE
Group Therapy Note    Date: 7/7/2019  Start Time: 6104  End Time:  0626  Number of Participants: 8    Type of Group: Psychoeducation      Group Goal: Educate on Positive coping skills, mindfulness, self care, need for support, and relapse prevention. We discussed stress, positive coping, ineffective coping skills (anger, drugs, alcohol, isolation, self neglect, not sleeping or sleeping too much, non compliance of meds, eating, shopping, and living in past of fear of future). Notes:  Asked each patient to give one think they are thankful for today:  N/a    And rate coping skills today on scale of 1-10 with 10 the best coping:  n/a        Status After Intervention: n/a    Participation Level: n/a did not attend this group.     Participation Quality: n/a      Speech:  n/a      Thought Process/Content: n/a      Affective Functioning: n/a      Mood: n/a      Level of consciousness: n/a      Response to Learning: Able to verbalize current knowledge/experience, Able to verbalize/acknowledge new learning, Able to retain information, Capable of insight and Able to change behavior      Endings: n/a    Modes of Intervention: Education, Support and Socialization      Discipline Responsible: Registered Nurse      Signature:  JUDSON Guevara, RN MSN

## 2019-07-07 NOTE — PLAN OF CARE
Ongoing  Note:   Patient is working toward establishing a safety plan for discharge. Problem: Anxiety:  Goal: Level of anxiety will decrease  Description  Level of anxiety will decrease  7/7/2019 1106 by Katlyn Del Castillo RN  Outcome: Ongoing  Note:   Pt not complaining of any anxiety. 7/7/2019 0325 by Brittaney Richardson RN  Outcome: Ongoing  Note:   Patient continues to feel anxious especially at bedtime not wanting to be by the window stating \"It terrifies me \". Problem: Activity:  Goal: Sleeping patterns will improve  Description  Sleeping patterns will improve  7/7/2019 1106 by Katlyn Del Castillo RN  Outcome: Ongoing  Note:   Patient slept 7 hours last night, reports they  slept well. Encourage patient not to take naps during the day, relax several hours before bed and to take prescribed sleep meds as ordered. Patient verbalized understanding. 7/7/2019 0325 by Brittaney Richarsdon RN  Outcome: Ongoing  Note:   Patient slept 7 continuous hours last night per report. Problem: Agitation  Goal: Decrease in feelings of agitation  7/7/2019 1106 by Katlyn Del Castillo RN  Outcome: Ongoing  Note:   Pt affect flat and blunt. 7/7/2019 0325 by Brittaney Richardson RN  Outcome: Ongoing  Note:   Patient was slightly irritable during the shift assessment. Problem: Nutrition  Goal: Optimal nutrition therapy  7/7/2019 1106 by Katlyn Del Castillo RN  Outcome: Ongoing  Note:   Pt eating without issues or concerns  7/7/2019 0325 by Brittaney Richardson RN  Outcome: Met This Shift  Note:   Patient hasd a bedtime snack at bedtime. Problem: Safety:  Goal: Risk of elopement will decrease  Description  Risk of elopement will decrease  7/7/2019 1106 by Katlyn Del Castillo RN  Outcome: Ongoing  Note:   Pt not trying to elope the unit at this time . 7/7/2019 0325 by Brittaney Richardson RN  Outcome: Met This Shift  Note:   Patient has not been exit seeking this shift.   Goal: Ability to remain free from injury will

## 2019-07-07 NOTE — PROGRESS NOTES
Speech: slow   mood: I am tired  Affect: able to smile today   Thought processes: getting better    thought content: Suicidal ideation:  denies         Homicidal ideations: denies            Hallucinations: denies         Delusions: Paranoid  Cognition: Oriented x3, not to situation  Insight and Judgement: fair   Attention and concentration are limited      ASSESSMENT     Psychosis ([facility] [address]     PLAN  Will change her depakote to pm  Encouraged to attend groups  Parents would like to have a family meeting on Monday     Electronically signed by Risa Valadez MD on 7/7/2019 at 10:29 AM

## 2019-07-08 VITALS
WEIGHT: 124.5 LBS | HEIGHT: 66 IN | RESPIRATION RATE: 16 BRPM | HEART RATE: 92 BPM | BODY MASS INDEX: 20.01 KG/M2 | OXYGEN SATURATION: 97 % | SYSTOLIC BLOOD PRESSURE: 94 MMHG | DIASTOLIC BLOOD PRESSURE: 68 MMHG | TEMPERATURE: 96.9 F

## 2019-07-08 PROCEDURE — 5130000000 HC BRIDGE APPOINTMENT

## 2019-07-08 PROCEDURE — 6370000000 HC RX 637 (ALT 250 FOR IP): Performed by: PSYCHIATRY & NEUROLOGY

## 2019-07-08 PROCEDURE — 99239 HOSP IP/OBS DSCHRG MGMT >30: CPT | Performed by: PSYCHIATRY & NEUROLOGY

## 2019-07-08 RX ORDER — RISPERIDONE 1 MG/1
1 TABLET, ORALLY DISINTEGRATING ORAL 2 TIMES DAILY
Qty: 60 TABLET | Refills: 0 | Status: SHIPPED | OUTPATIENT
Start: 2019-07-08

## 2019-07-08 RX ORDER — DIVALPROEX SODIUM 250 MG/1
250 TABLET, EXTENDED RELEASE ORAL NIGHTLY
Qty: 30 TABLET | Refills: 0 | Status: SHIPPED | OUTPATIENT
Start: 2019-07-08

## 2019-07-08 RX ADMIN — RISPERIDONE 1 MG: 1 TABLET, ORALLY DISINTEGRATING ORAL at 07:34

## 2019-07-08 ASSESSMENT — PAIN SCALES - GENERAL: PAINLEVEL_OUTOF10: 0

## 2019-07-08 NOTE — PLAN OF CARE
feelings of agitation to nursing staff. Patient bright with staff. Care plan reviewed with patient. Patient does verbalize understanding of the plan of care and does not contribute to goal setting.
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
## 2019-07-08 NOTE — PROGRESS NOTES
Behavioral Health   Discharge Note    Pt discharged with followings belongings:   Dentures: None  Vision - Corrective Lenses: None  Hearing Aid: None  Jewelry: None  Clothing: Other (Comment), Undergarments (Comment), Footwear, Pants, Shirt, Socks  Were All Patient Medications Collected?: Not Applicable  Other Valuables: None   Valuables sent home with patient . Valuables retrieved from safe,   Patient left department with Departure Mode: Family member via Mobility at Departure: Ambulatory, discharged to Discharged to: Private Residence. Patient education on aftercare instructions:   Bridge Appointment completed: Reviewed Discharge Instructions with patient. Patient verbalizes understanding and agreement with the discharge plan using the teachback method. Patient verbalize understanding of AVS:  yes. Status EXAM upon discharge:  Status and Exam  Normal: No  Facial Expression: Flat  Affect: Appropriate  Level of Consciousness: Alert  Mood:Normal: No  Mood: Depressed  Motor Activity:Normal: Yes  Motor Activity: Increased  Interview Behavior: Cooperative  Preception: [place] to Time, [place] to Person, [place] Shear to Place, [place] to Situation  Attention:Normal: No  Attention: Distractible  Thought Processes: Loose Assoc. Thought Content:Normal: Yes  Thought Content: Poverty of Content  Hallucinations: None  Delusions: No  Delusions:  Other(See Comment)  Memory:Normal: No  Memory: Poor Recent, Poor Remote  Insight and Judgment: No  Insight and Judgment: Poor Insight, Poor Judgment  Present Suicidal Ideation: No  Present Homicidal Ideation: No    Clerance Credit, RN